# Patient Record
Sex: FEMALE | Race: WHITE | NOT HISPANIC OR LATINO | URBAN - METROPOLITAN AREA
[De-identification: names, ages, dates, MRNs, and addresses within clinical notes are randomized per-mention and may not be internally consistent; named-entity substitution may affect disease eponyms.]

---

## 2017-02-27 ENCOUNTER — INPATIENT (INPATIENT)
Facility: HOSPITAL | Age: 82
LOS: 6 days | Discharge: HOME | End: 2017-03-06
Attending: INTERNAL MEDICINE | Admitting: INTERNAL MEDICINE
Payer: COMMERCIAL

## 2017-02-27 VITALS
SYSTOLIC BLOOD PRESSURE: 125 MMHG | OXYGEN SATURATION: 87 % | DIASTOLIC BLOOD PRESSURE: 67 MMHG | RESPIRATION RATE: 22 BRPM | WEIGHT: 207.01 LBS | TEMPERATURE: 100 F | HEART RATE: 86 BPM | HEIGHT: 66 IN

## 2017-02-27 DIAGNOSIS — E03.9 HYPOTHYROIDISM, UNSPECIFIED: ICD-10-CM

## 2017-02-27 DIAGNOSIS — E66.9 OBESITY, UNSPECIFIED: ICD-10-CM

## 2017-02-27 DIAGNOSIS — B96.0 MYCOPLASMA PNEUMONIAE [M. PNEUMONIAE] AS THE CAUSE OF DISEASES CLASSIFIED ELSEWHERE: ICD-10-CM

## 2017-02-27 DIAGNOSIS — I10 ESSENTIAL (PRIMARY) HYPERTENSION: ICD-10-CM

## 2017-02-27 DIAGNOSIS — Z79.82 LONG TERM (CURRENT) USE OF ASPIRIN: ICD-10-CM

## 2017-02-27 DIAGNOSIS — J18.9 PNEUMONIA, UNSPECIFIED ORGANISM: ICD-10-CM

## 2017-02-27 DIAGNOSIS — F03.90 UNSPECIFIED DEMENTIA, UNSPECIFIED SEVERITY, WITHOUT BEHAVIORAL DISTURBANCE, PSYCHOTIC DISTURBANCE, MOOD DISTURBANCE, AND ANXIETY: ICD-10-CM

## 2017-02-27 DIAGNOSIS — B97.81 HUMAN METAPNEUMOVIRUS AS THE CAUSE OF DISEASES CLASSIFIED ELSEWHERE: ICD-10-CM

## 2017-02-27 DIAGNOSIS — R50.9 FEVER, UNSPECIFIED: ICD-10-CM

## 2017-02-27 DIAGNOSIS — J20.8 ACUTE BRONCHITIS DUE TO OTHER SPECIFIED ORGANISMS: ICD-10-CM

## 2017-02-27 DIAGNOSIS — Z98.890 OTHER SPECIFIED POSTPROCEDURAL STATES: Chronic | ICD-10-CM

## 2017-02-27 LAB
ALBUMIN SERPL ELPH-MCNC: 3.2 G/DL — LOW (ref 3.3–5)
ALP SERPL-CCNC: 42 U/L — SIGNIFICANT CHANGE UP (ref 40–120)
ALT FLD-CCNC: 17 U/L — SIGNIFICANT CHANGE UP (ref 12–78)
ANION GAP SERPL CALC-SCNC: 9 MMOL/L — SIGNIFICANT CHANGE UP (ref 5–17)
APPEARANCE UR: ABNORMAL
AST SERPL-CCNC: 23 U/L — SIGNIFICANT CHANGE UP (ref 15–37)
BACTERIA # UR AUTO: ABNORMAL
BASOPHILS # BLD AUTO: 0.1 K/UL — SIGNIFICANT CHANGE UP (ref 0–0.2)
BASOPHILS NFR BLD AUTO: 1 % — SIGNIFICANT CHANGE UP (ref 0–2)
BILIRUB SERPL-MCNC: 0.5 MG/DL — SIGNIFICANT CHANGE UP (ref 0.2–1.2)
BILIRUB UR-MCNC: NEGATIVE — SIGNIFICANT CHANGE UP
BUN SERPL-MCNC: 11 MG/DL — SIGNIFICANT CHANGE UP (ref 7–23)
CALCIUM SERPL-MCNC: 7.9 MG/DL — LOW (ref 8.5–10.1)
CHLORIDE SERPL-SCNC: 103 MMOL/L — SIGNIFICANT CHANGE UP (ref 96–108)
CO2 SERPL-SCNC: 27 MMOL/L — SIGNIFICANT CHANGE UP (ref 22–31)
COLOR SPEC: YELLOW — SIGNIFICANT CHANGE UP
CREAT SERPL-MCNC: 0.91 MG/DL — SIGNIFICANT CHANGE UP (ref 0.5–1.3)
DIFF PNL FLD: NEGATIVE — SIGNIFICANT CHANGE UP
EOSINOPHIL # BLD AUTO: 0.1 K/UL — SIGNIFICANT CHANGE UP (ref 0–0.5)
EOSINOPHIL NFR BLD AUTO: 2 % — SIGNIFICANT CHANGE UP (ref 0–6)
EPI CELLS # UR: SIGNIFICANT CHANGE UP
FLUAV SPEC QL CULT: NEGATIVE — SIGNIFICANT CHANGE UP
FLUBV AG SPEC QL IA: NEGATIVE — SIGNIFICANT CHANGE UP
GLUCOSE SERPL-MCNC: 138 MG/DL — HIGH (ref 70–99)
GLUCOSE UR QL: NEGATIVE MG/DL — SIGNIFICANT CHANGE UP
HCT VFR BLD CALC: 38 % — SIGNIFICANT CHANGE UP (ref 34.5–45)
HGB BLD-MCNC: 13.2 G/DL — SIGNIFICANT CHANGE UP (ref 11.5–15.5)
KETONES UR-MCNC: NEGATIVE — SIGNIFICANT CHANGE UP
LACTATE SERPL-SCNC: 1 MMOL/L — SIGNIFICANT CHANGE UP (ref 0.7–2)
LEUKOCYTE ESTERASE UR-ACNC: NEGATIVE — SIGNIFICANT CHANGE UP
LYMPHOCYTES # BLD AUTO: 0.9 K/UL — LOW (ref 1–3.3)
LYMPHOCYTES # BLD AUTO: 14.2 % — SIGNIFICANT CHANGE UP (ref 13–44)
MCHC RBC-ENTMCNC: 32.8 PG — SIGNIFICANT CHANGE UP (ref 27–34)
MCHC RBC-ENTMCNC: 34.6 GM/DL — SIGNIFICANT CHANGE UP (ref 32–36)
MCV RBC AUTO: 94.9 FL — SIGNIFICANT CHANGE UP (ref 80–100)
MONOCYTES # BLD AUTO: 0.5 K/UL — SIGNIFICANT CHANGE UP (ref 0–0.9)
MONOCYTES NFR BLD AUTO: 7.1 % — SIGNIFICANT CHANGE UP (ref 2–14)
NEUTROPHILS # BLD AUTO: 4.9 K/UL — SIGNIFICANT CHANGE UP (ref 1.8–7.4)
NEUTROPHILS NFR BLD AUTO: 75.7 % — SIGNIFICANT CHANGE UP (ref 43–77)
NITRITE UR-MCNC: NEGATIVE — SIGNIFICANT CHANGE UP
PH UR: 5 — SIGNIFICANT CHANGE UP (ref 4.8–8)
PLATELET # BLD AUTO: 206 K/UL — SIGNIFICANT CHANGE UP (ref 150–400)
POTASSIUM SERPL-MCNC: 4.2 MMOL/L — SIGNIFICANT CHANGE UP (ref 3.5–5.3)
POTASSIUM SERPL-SCNC: 4.2 MMOL/L — SIGNIFICANT CHANGE UP (ref 3.5–5.3)
PROT SERPL-MCNC: 7 GM/DL — SIGNIFICANT CHANGE UP (ref 6–8.3)
PROT UR-MCNC: 30 MG/DL
RBC # BLD: 4.01 M/UL — SIGNIFICANT CHANGE UP (ref 3.8–5.2)
RBC # FLD: 12.7 % — SIGNIFICANT CHANGE UP (ref 11–15)
SODIUM SERPL-SCNC: 139 MMOL/L — SIGNIFICANT CHANGE UP (ref 135–145)
SP GR SPEC: 1.02 — SIGNIFICANT CHANGE UP (ref 1.01–1.02)
TSH SERPL-MCNC: 4.5 UIU/ML — HIGH (ref 0.36–3.74)
URATE CRY FLD QL MICRO: ABNORMAL
UROBILINOGEN FLD QL: NEGATIVE MG/DL — SIGNIFICANT CHANGE UP
WBC # BLD: 6.4 K/UL — SIGNIFICANT CHANGE UP (ref 3.8–10.5)
WBC # FLD AUTO: 6.4 K/UL — SIGNIFICANT CHANGE UP (ref 3.8–10.5)
WBC UR QL: SIGNIFICANT CHANGE UP

## 2017-02-27 PROCEDURE — 99285 EMERGENCY DEPT VISIT HI MDM: CPT

## 2017-02-27 PROCEDURE — 71010: CPT | Mod: 26

## 2017-02-27 RX ORDER — LEVOTHYROXINE SODIUM 125 MCG
75 TABLET ORAL DAILY
Qty: 0 | Refills: 0 | Status: DISCONTINUED | OUTPATIENT
Start: 2017-02-27 | End: 2017-03-06

## 2017-02-27 RX ORDER — SERTRALINE 25 MG/1
25 TABLET, FILM COATED ORAL DAILY
Qty: 0 | Refills: 0 | Status: DISCONTINUED | OUTPATIENT
Start: 2017-02-27 | End: 2017-03-06

## 2017-02-27 RX ORDER — HEPARIN SODIUM 5000 [USP'U]/ML
5000 INJECTION INTRAVENOUS; SUBCUTANEOUS EVERY 12 HOURS
Qty: 0 | Refills: 0 | Status: DISCONTINUED | OUTPATIENT
Start: 2017-02-27 | End: 2017-03-06

## 2017-02-27 RX ORDER — ACETAMINOPHEN 500 MG
650 TABLET ORAL EVERY 6 HOURS
Qty: 0 | Refills: 0 | Status: DISCONTINUED | OUTPATIENT
Start: 2017-02-27 | End: 2017-03-06

## 2017-02-27 RX ORDER — ACETAMINOPHEN 500 MG
650 TABLET ORAL ONCE
Qty: 0 | Refills: 0 | Status: COMPLETED | OUTPATIENT
Start: 2017-02-27 | End: 2017-02-27

## 2017-02-27 RX ORDER — QUETIAPINE FUMARATE 200 MG/1
25 TABLET, FILM COATED ORAL DAILY
Qty: 0 | Refills: 0 | Status: DISCONTINUED | OUTPATIENT
Start: 2017-02-27 | End: 2017-03-06

## 2017-02-27 RX ORDER — DONEPEZIL HYDROCHLORIDE 10 MG/1
10 TABLET, FILM COATED ORAL AT BEDTIME
Qty: 0 | Refills: 0 | Status: DISCONTINUED | OUTPATIENT
Start: 2017-02-27 | End: 2017-03-06

## 2017-02-27 RX ORDER — PIPERACILLIN AND TAZOBACTAM 4; .5 G/20ML; G/20ML
3.38 INJECTION, POWDER, LYOPHILIZED, FOR SOLUTION INTRAVENOUS ONCE
Qty: 0 | Refills: 0 | Status: COMPLETED | OUTPATIENT
Start: 2017-02-27 | End: 2017-02-27

## 2017-02-27 RX ORDER — MAGNESIUM OXIDE 400 MG ORAL TABLET 241.3 MG
400 TABLET ORAL DAILY
Qty: 0 | Refills: 0 | Status: DISCONTINUED | OUTPATIENT
Start: 2017-02-27 | End: 2017-03-06

## 2017-02-27 RX ORDER — ASPIRIN/CALCIUM CARB/MAGNESIUM 324 MG
81 TABLET ORAL DAILY
Qty: 0 | Refills: 0 | Status: DISCONTINUED | OUTPATIENT
Start: 2017-02-27 | End: 2017-03-06

## 2017-02-27 RX ORDER — PIPERACILLIN AND TAZOBACTAM 4; .5 G/20ML; G/20ML
3.38 INJECTION, POWDER, LYOPHILIZED, FOR SOLUTION INTRAVENOUS EVERY 8 HOURS
Qty: 0 | Refills: 0 | Status: DISCONTINUED | OUTPATIENT
Start: 2017-02-27 | End: 2017-03-05

## 2017-02-27 RX ORDER — SODIUM CHLORIDE 9 MG/ML
500 INJECTION INTRAMUSCULAR; INTRAVENOUS; SUBCUTANEOUS
Qty: 0 | Refills: 0 | Status: COMPLETED | OUTPATIENT
Start: 2017-02-27 | End: 2017-02-27

## 2017-02-27 RX ORDER — SODIUM CHLORIDE 9 MG/ML
3 INJECTION INTRAMUSCULAR; INTRAVENOUS; SUBCUTANEOUS ONCE
Qty: 0 | Refills: 0 | Status: COMPLETED | OUTPATIENT
Start: 2017-02-27 | End: 2017-02-27

## 2017-02-27 RX ORDER — SIMVASTATIN 20 MG/1
20 TABLET, FILM COATED ORAL AT BEDTIME
Qty: 0 | Refills: 0 | Status: DISCONTINUED | OUTPATIENT
Start: 2017-02-27 | End: 2017-03-06

## 2017-02-27 RX ADMIN — SODIUM CHLORIDE 2000 MILLILITER(S): 9 INJECTION INTRAMUSCULAR; INTRAVENOUS; SUBCUTANEOUS at 17:35

## 2017-02-27 RX ADMIN — Medication 650 MILLIGRAM(S): at 17:50

## 2017-02-27 RX ADMIN — DONEPEZIL HYDROCHLORIDE 10 MILLIGRAM(S): 10 TABLET, FILM COATED ORAL at 21:45

## 2017-02-27 RX ADMIN — SODIUM CHLORIDE 2000 MILLILITER(S): 9 INJECTION INTRAMUSCULAR; INTRAVENOUS; SUBCUTANEOUS at 18:00

## 2017-02-27 RX ADMIN — SIMVASTATIN 20 MILLIGRAM(S): 20 TABLET, FILM COATED ORAL at 21:45

## 2017-02-27 RX ADMIN — Medication 75 MICROGRAM(S): at 21:45

## 2017-02-27 RX ADMIN — SODIUM CHLORIDE 3 MILLILITER(S): 9 INJECTION INTRAMUSCULAR; INTRAVENOUS; SUBCUTANEOUS at 17:30

## 2017-02-27 RX ADMIN — SODIUM CHLORIDE 2000 MILLILITER(S): 9 INJECTION INTRAMUSCULAR; INTRAVENOUS; SUBCUTANEOUS at 17:50

## 2017-02-27 RX ADMIN — SODIUM CHLORIDE 2000 MILLILITER(S): 9 INJECTION INTRAMUSCULAR; INTRAVENOUS; SUBCUTANEOUS at 18:15

## 2017-02-27 RX ADMIN — QUETIAPINE FUMARATE 25 MILLIGRAM(S): 200 TABLET, FILM COATED ORAL at 21:49

## 2017-02-27 RX ADMIN — PIPERACILLIN AND TAZOBACTAM 200 GRAM(S): 4; .5 INJECTION, POWDER, LYOPHILIZED, FOR SOLUTION INTRAVENOUS at 17:54

## 2017-02-27 RX ADMIN — SODIUM CHLORIDE 2000 MILLILITER(S): 9 INJECTION INTRAMUSCULAR; INTRAVENOUS; SUBCUTANEOUS at 18:29

## 2017-02-27 NOTE — ED PROVIDER NOTE - OBJECTIVE STATEMENT
Patient sent in from assisted living facility for evaluation of cough and fever. Tmax 101.3F at facility and has been on augmentin for cough for 2 days. Patient is poor historian with hx of dementia but denies any chest pain but does have some dyspnea. She states she has cough for three days.

## 2017-02-27 NOTE — H&P ADULT. - HISTORY OF PRESENT ILLNESS
: Patient sent in from assisted living facility for evaluation of cough and fever. Tmax 101.3F at facility and has been on augmentin for cough for 2 days. Patient is poor historian with hx of dementia but denies any chest pain but does have some dyspnea. She states she has cough for three days.

## 2017-02-27 NOTE — ED PROVIDER NOTE - MEDICAL DECISION MAKING DETAILS
Patient meets crtieria for sepsis will evalute with labs, cxr, and treat with 30cc/kg ivf and zosyn. Patient meets criteria for sepsis will evaluate with labs, cxr, and treat with 30cc/kg ivf and zosyn. Tylenol ordered for fever.

## 2017-02-28 LAB
ANION GAP SERPL CALC-SCNC: 8 MMOL/L — SIGNIFICANT CHANGE UP (ref 5–17)
BUN SERPL-MCNC: 11 MG/DL — SIGNIFICANT CHANGE UP (ref 7–23)
CALCIUM SERPL-MCNC: 7.7 MG/DL — LOW (ref 8.5–10.1)
CHLORIDE SERPL-SCNC: 106 MMOL/L — SIGNIFICANT CHANGE UP (ref 96–108)
CO2 SERPL-SCNC: 26 MMOL/L — SIGNIFICANT CHANGE UP (ref 22–31)
CREAT SERPL-MCNC: 0.88 MG/DL — SIGNIFICANT CHANGE UP (ref 0.5–1.3)
CULTURE RESULTS: NO GROWTH — SIGNIFICANT CHANGE UP
GLUCOSE SERPL-MCNC: 170 MG/DL — HIGH (ref 70–99)
HCT VFR BLD CALC: 36 % — SIGNIFICANT CHANGE UP (ref 34.5–45)
HGB BLD-MCNC: 12.8 G/DL — SIGNIFICANT CHANGE UP (ref 11.5–15.5)
MCHC RBC-ENTMCNC: 33.9 PG — SIGNIFICANT CHANGE UP (ref 27–34)
MCHC RBC-ENTMCNC: 35.7 GM/DL — SIGNIFICANT CHANGE UP (ref 32–36)
MCV RBC AUTO: 94.9 FL — SIGNIFICANT CHANGE UP (ref 80–100)
NT-PROBNP SERPL-SCNC: 2281 PG/ML — HIGH (ref 0–450)
PLATELET # BLD AUTO: 184 K/UL — SIGNIFICANT CHANGE UP (ref 150–400)
POTASSIUM SERPL-MCNC: 3.9 MMOL/L — SIGNIFICANT CHANGE UP (ref 3.5–5.3)
POTASSIUM SERPL-SCNC: 3.9 MMOL/L — SIGNIFICANT CHANGE UP (ref 3.5–5.3)
RBC # BLD: 3.79 M/UL — LOW (ref 3.8–5.2)
RBC # FLD: 13.2 % — SIGNIFICANT CHANGE UP (ref 11–15)
SODIUM SERPL-SCNC: 140 MMOL/L — SIGNIFICANT CHANGE UP (ref 135–145)
SPECIMEN SOURCE: SIGNIFICANT CHANGE UP
T3 SERPL-MCNC: 68 NG/DL — LOW (ref 80–200)
T4 AB SER-ACNC: 7.4 UG/DL — SIGNIFICANT CHANGE UP (ref 4.6–12)
WBC # BLD: 5.8 K/UL — SIGNIFICANT CHANGE UP (ref 3.8–10.5)
WBC # FLD AUTO: 5.8 K/UL — SIGNIFICANT CHANGE UP (ref 3.8–10.5)

## 2017-02-28 RX ORDER — IPRATROPIUM/ALBUTEROL SULFATE 18-103MCG
3 AEROSOL WITH ADAPTER (GRAM) INHALATION ONCE
Qty: 0 | Refills: 0 | Status: COMPLETED | OUTPATIENT
Start: 2017-02-28 | End: 2017-02-28

## 2017-02-28 RX ORDER — FUROSEMIDE 40 MG
20 TABLET ORAL ONCE
Qty: 0 | Refills: 0 | Status: COMPLETED | OUTPATIENT
Start: 2017-02-28 | End: 2017-02-28

## 2017-02-28 RX ADMIN — SIMVASTATIN 20 MILLIGRAM(S): 20 TABLET, FILM COATED ORAL at 21:01

## 2017-02-28 RX ADMIN — SERTRALINE 25 MILLIGRAM(S): 25 TABLET, FILM COATED ORAL at 13:21

## 2017-02-28 RX ADMIN — HEPARIN SODIUM 5000 UNIT(S): 5000 INJECTION INTRAVENOUS; SUBCUTANEOUS at 05:32

## 2017-02-28 RX ADMIN — Medication 75 MICROGRAM(S): at 05:32

## 2017-02-28 RX ADMIN — Medication 81 MILLIGRAM(S): at 13:21

## 2017-02-28 RX ADMIN — MAGNESIUM OXIDE 400 MG ORAL TABLET 400 MILLIGRAM(S): 241.3 TABLET ORAL at 13:21

## 2017-02-28 RX ADMIN — Medication 3 MILLILITER(S): at 05:02

## 2017-02-28 RX ADMIN — QUETIAPINE FUMARATE 25 MILLIGRAM(S): 200 TABLET, FILM COATED ORAL at 13:21

## 2017-02-28 RX ADMIN — Medication 200 MILLIGRAM(S): at 03:46

## 2017-02-28 RX ADMIN — Medication 20 MILLIGRAM(S): at 05:31

## 2017-02-28 RX ADMIN — PIPERACILLIN AND TAZOBACTAM 25 GRAM(S): 4; .5 INJECTION, POWDER, LYOPHILIZED, FOR SOLUTION INTRAVENOUS at 21:01

## 2017-02-28 RX ADMIN — HEPARIN SODIUM 5000 UNIT(S): 5000 INJECTION INTRAVENOUS; SUBCUTANEOUS at 19:19

## 2017-02-28 RX ADMIN — PIPERACILLIN AND TAZOBACTAM 25 GRAM(S): 4; .5 INJECTION, POWDER, LYOPHILIZED, FOR SOLUTION INTRAVENOUS at 11:48

## 2017-02-28 RX ADMIN — DONEPEZIL HYDROCHLORIDE 10 MILLIGRAM(S): 10 TABLET, FILM COATED ORAL at 21:01

## 2017-02-28 RX ADMIN — PIPERACILLIN AND TAZOBACTAM 25 GRAM(S): 4; .5 INJECTION, POWDER, LYOPHILIZED, FOR SOLUTION INTRAVENOUS at 03:47

## 2017-02-28 NOTE — PHYSICAL THERAPY INITIAL EVALUATION ADULT - PLANNED THERAPY INTERVENTIONS, PT EVAL
gait training/balance training/bed mobility training/strengthening/transfer training/postural re-education

## 2017-02-28 NOTE — PHYSICAL THERAPY INITIAL EVALUATION ADULT - IMPAIRMENTS CONTRIBUTING TO GAIT DEVIATIONS, PT EVAL
narrow base of support/impaired balance/impaired coordination/decreased strength/impaired postural control

## 2017-02-28 NOTE — PROGRESS NOTE ADULT - SUBJECTIVE AND OBJECTIVE BOX
Patient is a 89y old  Female who presents with a chief complaint of fever, cough. r/o TAZ TERRAZAS (2017 19:26)   admitted for pneumoniaLLL    INTERVAL HPI/OVERNIGHT EVENTS: uneventful. no fever now. no distress    MEDICATIONS  (STANDING):  heparin  Injectable 5000Unit(s) SubCutaneous every 12 hours  piperacillin/tazobactam IVPB. 3.375Gram(s) IV Intermittent every 8 hours  aspirin  chewable 81milliGRAM(s) Oral daily  sertraline 25milliGRAM(s) Oral daily  simvastatin 20milliGRAM(s) Oral at bedtime  QUEtiapine 25milliGRAM(s) Oral daily  donepezil 10milliGRAM(s) Oral at bedtime  magnesium oxide 400milliGRAM(s) Oral daily  levothyroxine 75MICROGram(s) Oral daily    MEDICATIONS  (PRN):  acetaminophen   Tablet 650milliGRAM(s) Oral every 6 hours PRN For Temp greater than 38 C (100.4 F)      Allergies    No Known Allergies    Intolerances        REVIEW OF SYSTEMS:  CONSTITUTIONAL: No fever, weight loss, or fatigue  EYES: No eye pain, visual disturbances, or discharge  ENMT:  No difficulty hearing, tinnitus, vertigo; No sinus or throat pain  NECK: No pain or stiffness  BREASTS: No pain, masses, or nipple discharge  RESPIRATORY: No cough, wheezing, chills or hemoptysis; No shortness of breath  CARDIOVASCULAR: No chest pain, palpitations, dizziness, or leg swelling  GASTROINTESTINAL: No abdominal or epigastric pain. No nausea, vomiting, or hematemesis; No diarrhea or constipation. No melena or hematochezia.  GENITOURINARY: No dysuria, frequency, hematuria, or incontinence  NEUROLOGICAL: No headaches, memory loss, loss of strength, numbness, or tremors  SKIN: No itching, burning, rashes, or lesions   LYMPH NODES: No enlarged glands  ENDOCRINE: No heat or cold intolerance; No hair loss  MUSCULOSKELETAL: No joint pain or swelling; No muscle, back, or extremity pain  PSYCHIATRIC: No depression, anxiety, mood swings, or difficulty sleeping  HEME/LYMPH: No easy bruising, or bleeding gums  ALLERY AND IMMUNOLOGIC: No hives or eczema    Vital Signs Last 24 Hrs  T(C): 36.9, Max: 37.8 ( @ 17:06)  T(F): 98.4, Max: 100.1 (- @ 17:06)  HR: 65 (65 - 90)  BP: 107/52 (107/52 - 150/84)  BP(mean): --  RR: 20 (20 - 22)  SpO2: 97% (87% - 99%)    PHYSICAL EXAM:  GENERAL: NAD, well-groomed, well-developed  HEAD:  Atraumatic, Normocephalic  EYES: EOMI, PERRLA, conjunctiva and sclera clear  ENMT: No tonsillar erythema, exudates, or enlargement; Moist mucous membranes, Good dentition, No lesions  NECK: Supple, No JVD, Normal thyroid  NERVOUS SYSTEM:  Alert & Oriented X3, Good concentration; Motor Strength 5/5 B/L upper and lower extremities; DTRs 2+ intact and symmetric  CHEST/LUNG: Clear to percussion bilaterally; No rales, rhonchi, wheezing, or rubs  HEART: Regular rate and rhythm; No murmurs, rubs, or gallops  ABDOMEN: Soft, Nontender, Nondistended; Bowel sounds present  EXTREMITIES:  2+ Peripheral Pulses, No clubbing, cyanosis, or edema  LYMPH: No lymphadenopathy noted  SKIN: No rashes or lesions    LABS:                        12.8   5.8   )-----------( 184      ( 2017 08:14 )             36.0     2017 08:14    140    |  106    |  11     ----------------------------<  170    3.9     |  26     |  0.88     Ca    7.7        2017 08:14    TPro  7.0    /  Alb  3.2    /  TBili  0.5    /  DBili  x      /  AST  23     /  ALT  17     /  AlkPhos  42     2017 17:53        Urinalysis Basic - ( 2017 19:08 )    Color: Yellow / Appearance: very cloudy / S.025 / pH: x  Gluc: x / Ketone: Negative  / Bili: Negative / Urobili: Negative mg/dL   Blood: x / Protein: 30 mg/dL / Nitrite: Negative   Leuk Esterase: Negative / RBC: x / WBC 0-2   Sq Epi: x / Non Sq Epi: Few / Bacteria: Moderate      CAPILLARY BLOOD GLUCOSE                RADIOLOGY & ADDITIONAL TESTS:    Imaging Personally Reviewed:  [ ] YES  [ ] NO    Consultant(s) Notes Reviewed:  [ ] YES  [ ] NO    Care Discussed with Consultants/Other Providers [x ] YES  [ ] NO    Care discussed with family,         [  ]   yes  [  ]  No    imp:    stable[ ]    unstable[  ]     improving [ x  ]       unchanged  [  ]                Plans:  Continue present plans  [ x ]               New consult [  ]   specialty  .......               order test[  ]    test name.                  Discharge Planning  [  ]

## 2017-02-28 NOTE — PHYSICAL THERAPY INITIAL EVALUATION ADULT - MODIFIED CLINICAL TEST OF SENSORY INTEGRATION IN BALANCE TEST
Barthel Index: Feeding Score 10___, Bathing Score _0__, Grooming Score _0__, Dressing Score __0_, Bowels Score _0__, Bladder Score __0_, Toilet Score _0__, Transfers Score _0__, Mobility Score _0__, Stairs Score __0_,     Total Score __10_

## 2017-03-01 LAB
CRP SERPL-MCNC: 3.31 MG/DL — HIGH (ref 0–0.4)
ERYTHROCYTE [SEDIMENTATION RATE] IN BLOOD: 45 MM/HR — HIGH (ref 0–20)

## 2017-03-01 RX ORDER — DIPHENHYDRAMINE HCL 50 MG
25 CAPSULE ORAL ONCE
Qty: 0 | Refills: 0 | Status: COMPLETED | OUTPATIENT
Start: 2017-03-01 | End: 2017-03-02

## 2017-03-01 RX ORDER — DIPHENHYDRAMINE HCL 50 MG
25 CAPSULE ORAL ONCE
Qty: 0 | Refills: 0 | Status: COMPLETED | OUTPATIENT
Start: 2017-03-01 | End: 2017-03-01

## 2017-03-01 RX ORDER — IPRATROPIUM/ALBUTEROL SULFATE 18-103MCG
3 AEROSOL WITH ADAPTER (GRAM) INHALATION ONCE
Qty: 0 | Refills: 0 | Status: COMPLETED | OUTPATIENT
Start: 2017-03-01 | End: 2017-03-01

## 2017-03-01 RX ORDER — IPRATROPIUM/ALBUTEROL SULFATE 18-103MCG
3 AEROSOL WITH ADAPTER (GRAM) INHALATION EVERY 6 HOURS
Qty: 0 | Refills: 0 | Status: DISCONTINUED | OUTPATIENT
Start: 2017-03-01 | End: 2017-03-06

## 2017-03-01 RX ADMIN — PIPERACILLIN AND TAZOBACTAM 25 GRAM(S): 4; .5 INJECTION, POWDER, LYOPHILIZED, FOR SOLUTION INTRAVENOUS at 22:02

## 2017-03-01 RX ADMIN — PIPERACILLIN AND TAZOBACTAM 25 GRAM(S): 4; .5 INJECTION, POWDER, LYOPHILIZED, FOR SOLUTION INTRAVENOUS at 14:05

## 2017-03-01 RX ADMIN — MAGNESIUM OXIDE 400 MG ORAL TABLET 400 MILLIGRAM(S): 241.3 TABLET ORAL at 11:15

## 2017-03-01 RX ADMIN — SERTRALINE 25 MILLIGRAM(S): 25 TABLET, FILM COATED ORAL at 11:15

## 2017-03-01 RX ADMIN — Medication 3 MILLILITER(S): at 07:50

## 2017-03-01 RX ADMIN — Medication 3 MILLILITER(S): at 11:26

## 2017-03-01 RX ADMIN — Medication 100 MILLIGRAM(S): at 07:55

## 2017-03-01 RX ADMIN — Medication 3 MILLILITER(S): at 17:11

## 2017-03-01 RX ADMIN — HEPARIN SODIUM 5000 UNIT(S): 5000 INJECTION INTRAVENOUS; SUBCUTANEOUS at 06:00

## 2017-03-01 RX ADMIN — QUETIAPINE FUMARATE 25 MILLIGRAM(S): 200 TABLET, FILM COATED ORAL at 11:16

## 2017-03-01 RX ADMIN — DONEPEZIL HYDROCHLORIDE 10 MILLIGRAM(S): 10 TABLET, FILM COATED ORAL at 22:02

## 2017-03-01 RX ADMIN — Medication 650 MILLIGRAM(S): at 05:58

## 2017-03-01 RX ADMIN — Medication 100 MILLIGRAM(S): at 22:07

## 2017-03-01 RX ADMIN — SIMVASTATIN 20 MILLIGRAM(S): 20 TABLET, FILM COATED ORAL at 22:03

## 2017-03-01 RX ADMIN — Medication 25 MILLIGRAM(S): at 02:11

## 2017-03-01 RX ADMIN — Medication 81 MILLIGRAM(S): at 11:15

## 2017-03-01 RX ADMIN — PIPERACILLIN AND TAZOBACTAM 25 GRAM(S): 4; .5 INJECTION, POWDER, LYOPHILIZED, FOR SOLUTION INTRAVENOUS at 05:59

## 2017-03-01 RX ADMIN — HEPARIN SODIUM 5000 UNIT(S): 5000 INJECTION INTRAVENOUS; SUBCUTANEOUS at 17:46

## 2017-03-01 RX ADMIN — Medication 3 MILLILITER(S): at 23:48

## 2017-03-01 RX ADMIN — Medication 75 MICROGRAM(S): at 05:58

## 2017-03-01 NOTE — PROGRESS NOTE ADULT - SUBJECTIVE AND OBJECTIVE BOX
Patient is a 89y old  Female who presents with a chief complaint of fever, cough. r/o PNA, LLL (2017 19:26)  rapid RVP is positive. final report pending.    INTERVAL HPI/OVERNIGHT EVENTS:T max 101.2, some wheezing    MEDICATIONS  (STANDING):  heparin  Injectable 5000Unit(s) SubCutaneous every 12 hours  piperacillin/tazobactam IVPB. 3.375Gram(s) IV Intermittent every 8 hours  aspirin  chewable 81milliGRAM(s) Oral daily  sertraline 25milliGRAM(s) Oral daily  simvastatin 20milliGRAM(s) Oral at bedtime  QUEtiapine 25milliGRAM(s) Oral daily  donepezil 10milliGRAM(s) Oral at bedtime  magnesium oxide 400milliGRAM(s) Oral daily  levothyroxine 75MICROGram(s) Oral daily  ALBUTerol/ipratropium for Nebulization 3milliLiter(s) Nebulizer every 6 hours  levoFLOXacin IVPB  IV Intermittent     MEDICATIONS  (PRN):  acetaminophen   Tablet 650milliGRAM(s) Oral every 6 hours PRN For Temp greater than 38 C (100.4 F)  guaiFENesin    Syrup 100milliGRAM(s) Oral every 6 hours PRN Cough      Allergies    No Known Allergies    Intolerances        REVIEW OF SYSTEMS:  CONSTITUTIONAL: No fever, weight loss, or fatigue  EYES: No eye pain, visual disturbances, or discharge  ENMT:  No difficulty hearing, tinnitus, vertigo; No sinus or throat pain  NECK: No pain or stiffness  BREASTS: No pain, masses, or nipple discharge  RESPIRATORY: No cough, wheezing, chills or hemoptysis; No shortness of breath  CARDIOVASCULAR: No chest pain, palpitations, dizziness, or leg swelling  GASTROINTESTINAL: No abdominal or epigastric pain. No nausea, vomiting, or hematemesis; No diarrhea or constipation. No melena or hematochezia.  GENITOURINARY: No dysuria, frequency, hematuria, or incontinence  NEUROLOGICAL: No headaches, memory loss, loss of strength, numbness, or tremors  SKIN: No itching, burning, rashes, or lesions   LYMPH NODES: No enlarged glands  ENDOCRINE: No heat or cold intolerance; No hair loss  MUSCULOSKELETAL: No joint pain or swelling; No muscle, back, or extremity pain  PSYCHIATRIC: No depression, anxiety, mood swings, or difficulty sleeping  HEME/LYMPH: No easy bruising, or bleeding gums  ALLERY AND IMMUNOLOGIC: No hives or eczema    Vital Signs Last 24 Hrs  T(C): 38.8, Max: 38.8 ( @ 05:49)  T(F): 101.8, Max: 101.8 ( @ 05:49)  HR: 75 (65 - 79)  BP: 112/61 (107/52 - 126/57)  BP(mean): --  RR: 18 (18 - 20)  SpO2: 93% (93% - 97%)    PHYSICAL EXAM:  GENERAL: NAD, well-groomed, well-developed  HEAD:  Atraumatic, Normocephalic  EYES: EOMI, PERRLA, conjunctiva and sclera clear  ENMT: No tonsillar erythema, exudates, or enlargement; Moist mucous membranes, Good dentition, No lesions  NECK: Supple, No JVD, Normal thyroid  NERVOUS SYSTEM:  Alert & Oriented X3, Good concentration; Motor Strength 5/5 B/L upper and lower extremities; DTRs 2+ intact and symmetric  CHEST/LUNG: Clear to percussion bilaterally; No rales, rhonchi,  or rubs, mild wheezing at bases  HEART: Regular rate and rhythm; No murmurs, rubs, or gallops  ABDOMEN: Soft, Nontender, Nondistended; Bowel sounds present  EXTREMITIES:  2+ Peripheral Pulses, No clubbing, cyanosis, or edema  LYMPH: No lymphadenopathy noted  SKIN: No rashes or lesions    LABS:                        12.8   5.8   )-----------( 184      ( 2017 08:14 )             36.0     2017 08:14    140    |  106    |  11     ----------------------------<  170    3.9     |  26     |  0.88     Ca    7.7        2017 08:14    TPro  7.0    /  Alb  3.2    /  TBili  0.5    /  DBili  x      /  AST  23     /  ALT  17     /  AlkPhos  42     2017 17:53        Urinalysis Basic - ( 2017 19:08 )    Color: Yellow / Appearance: very cloudy / S.025 / pH: x  Gluc: x / Ketone: Negative  / Bili: Negative / Urobili: Negative mg/dL   Blood: x / Protein: 30 mg/dL / Nitrite: Negative   Leuk Esterase: Negative / RBC: x / WBC 0-2   Sq Epi: x / Non Sq Epi: Few / Bacteria: Moderate      CAPILLARY BLOOD GLUCOSE                RADIOLOGY & ADDITIONAL TESTS:    Imaging Personally Reviewed:  [ ] YES  [ ] NO    Consultant(s) Notes Reviewed:  [ ] YES  [ ] NO    Care Discussed with Consultants/Other Providers [ ] YES  [ ] NO    Care discussed with family,         [  ]   yes  [  ]  No    imp:    stable[ ]    unstable[  ]     improving [   ]       unchanged  [  x]                Plans:  Continue present plans  [ x ]               New consult [  ]   specialty  .......               order test[  ]    test name.                  Discharge Planning  [  ]

## 2017-03-02 LAB
HCT VFR BLD CALC: 34.6 % — SIGNIFICANT CHANGE UP (ref 34.5–45)
HGB BLD-MCNC: 12.5 G/DL — SIGNIFICANT CHANGE UP (ref 11.5–15.5)
MCHC RBC-ENTMCNC: 33.4 PG — SIGNIFICANT CHANGE UP (ref 27–34)
MCHC RBC-ENTMCNC: 36 GM/DL — SIGNIFICANT CHANGE UP (ref 32–36)
MCV RBC AUTO: 92.8 FL — SIGNIFICANT CHANGE UP (ref 80–100)
PLATELET # BLD AUTO: 172 K/UL — SIGNIFICANT CHANGE UP (ref 150–400)
RBC # BLD: 3.73 M/UL — LOW (ref 3.8–5.2)
RBC # FLD: 12.6 % — SIGNIFICANT CHANGE UP (ref 11–15)
WBC # BLD: 5.7 K/UL — SIGNIFICANT CHANGE UP (ref 3.8–10.5)
WBC # FLD AUTO: 5.7 K/UL — SIGNIFICANT CHANGE UP (ref 3.8–10.5)

## 2017-03-02 PROCEDURE — 71010: CPT | Mod: 26

## 2017-03-02 RX ORDER — DIPHENHYDRAMINE HCL 50 MG
25 CAPSULE ORAL ONCE
Qty: 0 | Refills: 0 | Status: COMPLETED | OUTPATIENT
Start: 2017-03-02 | End: 2017-03-02

## 2017-03-02 RX ADMIN — Medication 3 MILLILITER(S): at 11:25

## 2017-03-02 RX ADMIN — SIMVASTATIN 20 MILLIGRAM(S): 20 TABLET, FILM COATED ORAL at 21:04

## 2017-03-02 RX ADMIN — Medication 100 MILLIGRAM(S): at 04:54

## 2017-03-02 RX ADMIN — Medication 25 MILLIGRAM(S): at 22:52

## 2017-03-02 RX ADMIN — MAGNESIUM OXIDE 400 MG ORAL TABLET 400 MILLIGRAM(S): 241.3 TABLET ORAL at 12:48

## 2017-03-02 RX ADMIN — QUETIAPINE FUMARATE 25 MILLIGRAM(S): 200 TABLET, FILM COATED ORAL at 12:48

## 2017-03-02 RX ADMIN — Medication 3 MILLILITER(S): at 05:45

## 2017-03-02 RX ADMIN — PIPERACILLIN AND TAZOBACTAM 25 GRAM(S): 4; .5 INJECTION, POWDER, LYOPHILIZED, FOR SOLUTION INTRAVENOUS at 07:24

## 2017-03-02 RX ADMIN — SERTRALINE 25 MILLIGRAM(S): 25 TABLET, FILM COATED ORAL at 12:48

## 2017-03-02 RX ADMIN — Medication 81 MILLIGRAM(S): at 12:47

## 2017-03-02 RX ADMIN — Medication 3 MILLILITER(S): at 17:30

## 2017-03-02 RX ADMIN — HEPARIN SODIUM 5000 UNIT(S): 5000 INJECTION INTRAVENOUS; SUBCUTANEOUS at 17:05

## 2017-03-02 RX ADMIN — HEPARIN SODIUM 5000 UNIT(S): 5000 INJECTION INTRAVENOUS; SUBCUTANEOUS at 07:24

## 2017-03-02 RX ADMIN — DONEPEZIL HYDROCHLORIDE 10 MILLIGRAM(S): 10 TABLET, FILM COATED ORAL at 21:04

## 2017-03-02 RX ADMIN — Medication 25 MILLIGRAM(S): at 00:12

## 2017-03-02 RX ADMIN — Medication 75 MICROGRAM(S): at 07:24

## 2017-03-02 RX ADMIN — Medication 3 MILLILITER(S): at 23:53

## 2017-03-02 RX ADMIN — PIPERACILLIN AND TAZOBACTAM 25 GRAM(S): 4; .5 INJECTION, POWDER, LYOPHILIZED, FOR SOLUTION INTRAVENOUS at 23:06

## 2017-03-02 RX ADMIN — PIPERACILLIN AND TAZOBACTAM 25 GRAM(S): 4; .5 INJECTION, POWDER, LYOPHILIZED, FOR SOLUTION INTRAVENOUS at 16:09

## 2017-03-02 NOTE — CONSULT NOTE ADULT - SUBJECTIVE AND OBJECTIVE BOX
Patient is a 89y old  Female who presents with a chief complaint of shortness of breath (28 Feb 2017 02:43)      HPI:  Patient sent in from assisted living facility for evaluation of cough and fever. Tmax 101.3F at facility and has been on augmentin for cough for 2 days. Patient is poor historian with hx of dementia but denies any chest pain. does have some dyspnea with wheezing. She states she has cough for three days.    PAST MEDICAL & SURGICAL HISTORY:  Dementia  Hypothyroid  HTN (hypertension)  H/O knee surgery  No significant past surgical history    FAMILY HISTORY:  No pertinent family history in first degree relatives    SOCIAL HISTORY: non smoker    Allergies  No Known Allergies    MEDICATIONS  (STANDING):  heparin  Injectable 5000Unit(s) SubCutaneous every 12 hours  piperacillin/tazobactam IVPB. 3.375Gram(s) IV Intermittent every 8 hours  aspirin  chewable 81milliGRAM(s) Oral daily  sertraline 25milliGRAM(s) Oral daily  simvastatin 20milliGRAM(s) Oral at bedtime  QUEtiapine 25milliGRAM(s) Oral daily  donepezil 10milliGRAM(s) Oral at bedtime  magnesium oxide 400milliGRAM(s) Oral daily  levothyroxine 75MICROGram(s) Oral daily  levoFLOXacin IVPB 250milliGRAM(s) IV Intermittent every 24 hours  ALBUTerol/ipratropium for Nebulization 3milliLiter(s) Nebulizer every 6 hours  levoFLOXacin IVPB  IV Intermittent     MEDICATIONS  (PRN):  acetaminophen   Tablet 650milliGRAM(s) Oral every 6 hours PRN For Temp greater than 38 C (100.4 F)  guaiFENesin    Syrup 100milliGRAM(s) Oral every 6 hours PRN Cough    REVIEW OF SYSTEMS:    Constitutional:            +fever,   HEENT:                      No difficulty hearing, tinnitus, vertigo; No sinus or throat pain  Respiratory:               sob with wheezing, cough.  Cardiovascular:           No chest pain, palpitations  Gastrointestinal:        No abdominal or epigastric pain. No N/V/diarrhea or hematemesis  Genitourinary:            No dysuria, frequency, hematuria or incontinence  SKIN:                             no rash  Musculoskeletal:        No joint pain or swelling  Extremities:                No swelling  Neurological:              No headaches  Psychiatric:                 No depression, anxiety    Vital Signs Last 24 Hrs  T(C): 37.1, Max: 37.1 (03-01 @ 17:35)  T(F): 98.8, Max: 98.8 (03-01 @ 17:35)  HR: 71 (68 - 84)  BP: 115/62 (110/65 - 120/89)  BP(mean): --  RR: 18 (18 - 18)  SpO2: 96% (95% - 98%)    PHYSICAL EXAM:  GEN:         Awake, responsive and comfortable.  HEENT:    Normal.    RESP:      decreased air entry.  CVS:         Regular rate and rhythm.   ABD:         Soft, non-tender, non-distended;   :             No costovertebral angle tenderness  SKIN:           Warm and dry.  EXTR:            No clubbing, cyanosis or edema  CNS:              Intact sensory and motor function.  PSYCH:        cooperative, no anxiety or depression    LABS:                        12.5   5.7   )-----------( 172      ( 02 Mar 2017 06:11 )             34.6   EKG: regular rhythm    RADIOLOGY & ADDITIONAL STUDIES:    EXAM:  CHEST SINGLE VIEW                            PROCEDURE DATE:  02/27/2017        INTERPRETATION:  Single AP view of the chest. Comparison is made to   3/2/2014.    CLINICAL INFORMATION: Sepsis, cough    FINDINGS:  There are chronic lung changes and questionable small left   basilar atelectasis and/or pneumonia. There is mild elevation of the   right hemidiaphragm. Heart size is within normal limits.    IMPRESSION: Questionable small left basilar atelectasis and/or pneumonia.    DAPHNE DYKES M.D., ATTENDING RADIOLOGIST  This document has been electronically signed. Feb 27 2017  7:10PM      ASSESSMENT AND PLAN:  ·	SOB with wheezing.  ·	hMPV tracheobronchitis.  ·	Suspected left base pneumonia.  ·	HTN.  ·	Hypothyroidism.  ·	Dementia.    Continue antibiotics, nebulizer.  Will obtain procalcitonin level.  Follow up CXR.

## 2017-03-03 LAB
ANION GAP SERPL CALC-SCNC: 6 MMOL/L — SIGNIFICANT CHANGE UP (ref 5–17)
BUN SERPL-MCNC: 9 MG/DL — SIGNIFICANT CHANGE UP (ref 7–23)
CALCIUM SERPL-MCNC: 8 MG/DL — LOW (ref 8.5–10.1)
CHLORIDE SERPL-SCNC: 103 MMOL/L — SIGNIFICANT CHANGE UP (ref 96–108)
CO2 SERPL-SCNC: 31 MMOL/L — SIGNIFICANT CHANGE UP (ref 22–31)
CREAT SERPL-MCNC: 0.76 MG/DL — SIGNIFICANT CHANGE UP (ref 0.5–1.3)
GLUCOSE SERPL-MCNC: 129 MG/DL — HIGH (ref 70–99)
HCT VFR BLD CALC: 35.5 % — SIGNIFICANT CHANGE UP (ref 34.5–45)
HGB BLD-MCNC: 12.2 G/DL — SIGNIFICANT CHANGE UP (ref 11.5–15.5)
M PNEUMO IGG SER IA-ACNC: 1.64 INDEX — HIGH
M PNEUMO IGG SER IA-ACNC: POSITIVE
M PNEUMO IGM SER-ACNC: 102 UNITS/ML — SIGNIFICANT CHANGE UP
MAGNESIUM SERPL-MCNC: 2.6 MG/DL — HIGH (ref 1.8–2.4)
MCHC RBC-ENTMCNC: 32.3 PG — SIGNIFICANT CHANGE UP (ref 27–34)
MCHC RBC-ENTMCNC: 34.5 GM/DL — SIGNIFICANT CHANGE UP (ref 32–36)
MCV RBC AUTO: 93.6 FL — SIGNIFICANT CHANGE UP (ref 80–100)
MYCOPLASMA AG SPEC QL: NEGATIVE — SIGNIFICANT CHANGE UP
PLATELET # BLD AUTO: 194 K/UL — SIGNIFICANT CHANGE UP (ref 150–400)
POTASSIUM SERPL-MCNC: 3.6 MMOL/L — SIGNIFICANT CHANGE UP (ref 3.5–5.3)
POTASSIUM SERPL-SCNC: 3.6 MMOL/L — SIGNIFICANT CHANGE UP (ref 3.5–5.3)
PROCALCITONIN SERPL-MCNC: <0.05 NG/ML — SIGNIFICANT CHANGE UP (ref 0–0.05)
RBC # BLD: 3.79 M/UL — LOW (ref 3.8–5.2)
RBC # FLD: 12.5 % — SIGNIFICANT CHANGE UP (ref 11–15)
SODIUM SERPL-SCNC: 140 MMOL/L — SIGNIFICANT CHANGE UP (ref 135–145)
WBC # BLD: 5.4 K/UL — SIGNIFICANT CHANGE UP (ref 3.8–10.5)
WBC # FLD AUTO: 5.4 K/UL — SIGNIFICANT CHANGE UP (ref 3.8–10.5)

## 2017-03-03 RX ADMIN — PIPERACILLIN AND TAZOBACTAM 25 GRAM(S): 4; .5 INJECTION, POWDER, LYOPHILIZED, FOR SOLUTION INTRAVENOUS at 21:51

## 2017-03-03 RX ADMIN — Medication 3 MILLILITER(S): at 05:43

## 2017-03-03 RX ADMIN — SIMVASTATIN 20 MILLIGRAM(S): 20 TABLET, FILM COATED ORAL at 21:51

## 2017-03-03 RX ADMIN — HEPARIN SODIUM 5000 UNIT(S): 5000 INJECTION INTRAVENOUS; SUBCUTANEOUS at 05:14

## 2017-03-03 RX ADMIN — SERTRALINE 25 MILLIGRAM(S): 25 TABLET, FILM COATED ORAL at 14:48

## 2017-03-03 RX ADMIN — Medication 75 MICROGRAM(S): at 05:11

## 2017-03-03 RX ADMIN — DONEPEZIL HYDROCHLORIDE 10 MILLIGRAM(S): 10 TABLET, FILM COATED ORAL at 21:51

## 2017-03-03 RX ADMIN — Medication 3 MILLILITER(S): at 17:06

## 2017-03-03 RX ADMIN — HEPARIN SODIUM 5000 UNIT(S): 5000 INJECTION INTRAVENOUS; SUBCUTANEOUS at 17:43

## 2017-03-03 RX ADMIN — Medication 81 MILLIGRAM(S): at 14:47

## 2017-03-03 RX ADMIN — Medication 3 MILLILITER(S): at 23:44

## 2017-03-03 RX ADMIN — QUETIAPINE FUMARATE 25 MILLIGRAM(S): 200 TABLET, FILM COATED ORAL at 14:47

## 2017-03-03 RX ADMIN — Medication 3 MILLILITER(S): at 11:07

## 2017-03-03 RX ADMIN — MAGNESIUM OXIDE 400 MG ORAL TABLET 400 MILLIGRAM(S): 241.3 TABLET ORAL at 17:43

## 2017-03-03 RX ADMIN — PIPERACILLIN AND TAZOBACTAM 25 GRAM(S): 4; .5 INJECTION, POWDER, LYOPHILIZED, FOR SOLUTION INTRAVENOUS at 14:48

## 2017-03-03 RX ADMIN — PIPERACILLIN AND TAZOBACTAM 25 GRAM(S): 4; .5 INJECTION, POWDER, LYOPHILIZED, FOR SOLUTION INTRAVENOUS at 05:11

## 2017-03-03 NOTE — PROGRESS NOTE ADULT - SUBJECTIVE AND OBJECTIVE BOX
INTERVAL HPI/OVERNIGHT EVENTS:  Patient sent in from assisted living facility for evaluation of cough and fever. Tmax 101.3F at facility and has been on augmentin for cough for 2 days. Patient is poor historian with hx of dementia but denies any chest pain. does have some dyspnea with wheezing. She states she has cough for three days.  Awake, responsive, on nebulizer treatments.    Vital Signs Last 24 Hrs  T(C): 36.6, Max: 37.1 (03-02 @ 19:21)  T(F): 97.9, Max: 98.8 (03-02 @ 19:21)  HR: 71 (63 - 80)  BP: 124/68 (103/54 - 147/84)  BP(mean): --  RR: 16 (16 - 18)  SpO2: 98% (95% - 98%)    PHYSICAL EXAM:  GEN:        Awake, responsive and comfortable.  HEENT:    Normal.    RESP:      decreased air entry. occasional wheezing.  CVS:          Regular rate and rhythm.   ABD:         Soft, non-tender, non-distended;     MEDICATIONS  (STANDING):  heparin  Injectable 5000Unit(s) SubCutaneous every 12 hours  piperacillin/tazobactam IVPB. 3.375Gram(s) IV Intermittent every 8 hours  aspirin  chewable 81milliGRAM(s) Oral daily  sertraline 25milliGRAM(s) Oral daily  simvastatin 20milliGRAM(s) Oral at bedtime  QUEtiapine 25milliGRAM(s) Oral daily  donepezil 10milliGRAM(s) Oral at bedtime  magnesium oxide 400milliGRAM(s) Oral daily  levothyroxine 75MICROGram(s) Oral daily  levoFLOXacin IVPB 250milliGRAM(s) IV Intermittent every 24 hours  ALBUTerol/ipratropium for Nebulization 3milliLiter(s) Nebulizer every 6 hours  levoFLOXacin IVPB  IV Intermittent     MEDICATIONS  (PRN):  acetaminophen   Tablet 650milliGRAM(s) Oral every 6 hours PRN For Temp greater than 38 C (100.4 F)  guaiFENesin    Syrup 100milliGRAM(s) Oral every 6 hours PRN Cough  LORazepam   Injectable 0.5milliGRAM(s) IntraMuscular once PRN Agitation    LABS:                        12.2   5.4   )-----------( 194      ( 03 Mar 2017 08:54 )             35.5     03 Mar 2017 09:01    140    |  103    |  9      ----------------------------<  129    3.6     |  31     |  0.76     Ca    8.0        03 Mar 2017 09:01  Mg     2.6       03 Mar 2017 09:01    ASSESSMENT AND PLAN:  ·	SOB with wheezing.  ·	hMPV tracheobronchitis.  ·	Doubt bacterial pneumonia.  ·	HTN.  ·	Hypothyroidism.  ·	Dementia.    Continue Nebulizer with Chest PT as needed.  OOB to chair.

## 2017-03-03 NOTE — PROGRESS NOTE ADULT - SUBJECTIVE AND OBJECTIVE BOX
Patient is a 89y old  Female who presents with a chief complaint of shortness of breath (28 Feb 2017 02:43)  virus panel +. .pulmonary notes appreciated. there is improvement in general condition.     INTERVAL HPI/OVERNIGHT EVENTS:uneventful.     MEDICATIONS  (STANDING):  heparin  Injectable 5000Unit(s) SubCutaneous every 12 hours  piperacillin/tazobactam IVPB. 3.375Gram(s) IV Intermittent every 8 hours  aspirin  chewable 81milliGRAM(s) Oral daily  sertraline 25milliGRAM(s) Oral daily  simvastatin 20milliGRAM(s) Oral at bedtime  QUEtiapine 25milliGRAM(s) Oral daily  donepezil 10milliGRAM(s) Oral at bedtime  magnesium oxide 400milliGRAM(s) Oral daily  levothyroxine 75MICROGram(s) Oral daily  levoFLOXacin IVPB 250milliGRAM(s) IV Intermittent every 24 hours  ALBUTerol/ipratropium for Nebulization 3milliLiter(s) Nebulizer every 6 hours  levoFLOXacin IVPB  IV Intermittent     MEDICATIONS  (PRN):  acetaminophen   Tablet 650milliGRAM(s) Oral every 6 hours PRN For Temp greater than 38 C (100.4 F)  guaiFENesin    Syrup 100milliGRAM(s) Oral every 6 hours PRN Cough  LORazepam   Injectable 0.5milliGRAM(s) IntraMuscular once PRN Agitation      Allergies    No Known Allergies    Intolerances        REVIEW OF SYSTEMS:  CONSTITUTIONAL: No fever, weight loss, or fatigue  EYES: No eye pain, visual disturbances, or discharge  ENMT:  No difficulty hearing, tinnitus, vertigo; No sinus or throat pain  NECK: No pain or stiffness  BREASTS: No pain, masses, or nipple discharge  RESPIRATORY: No cough, wheezing, chills or hemoptysis; No shortness of breath  CARDIOVASCULAR: No chest pain, palpitations, dizziness, or leg swelling  GASTROINTESTINAL: No abdominal or epigastric pain. No nausea, vomiting, or hematemesis; No diarrhea or constipation. No melena or hematochezia.  GENITOURINARY: No dysuria, frequency, hematuria, or incontinence  NEUROLOGICAL: No headaches, memory loss, loss of strength, numbness, or tremors  SKIN: No itching, burning, rashes, or lesions   LYMPH NODES: No enlarged glands  ENDOCRINE: No heat or cold intolerance; No hair loss  MUSCULOSKELETAL: No joint pain or swelling; No muscle, back, or extremity pain  PSYCHIATRIC: No depression, anxiety, mood swings, or difficulty sleeping  HEME/LYMPH: No easy bruising, or bleeding gums  ALLERY AND IMMUNOLOGIC: No hives or eczema    Vital Signs Last 24 Hrs  T(C): 36.1, Max: 37.1 (03-02 @ 19:21)  T(F): 97, Max: 98.8 (03-02 @ 19:21)  HR: 73 (65 - 80)  BP: 147/84 (118/67 - 147/84)  BP(mean): --  RR: 16 (16 - 18)  SpO2: 98% (95% - 98%)    PHYSICAL EXAM:  GENERAL: NAD, well-groomed, well-developed  HEAD:  Atraumatic, Normocephalic  EYES: EOMI, PERRLA, conjunctiva and sclera clear  ENMT: No tonsillar erythema, exudates, or enlargement; Moist mucous membranes, Good dentition, No lesions  NECK: Supple, No JVD, Normal thyroid  NERVOUS SYSTEM:  Alert & Oriented X3, Good concentration; Motor Strength 5/5 B/L upper and lower extremities; DTRs 2+ intact and symmetric  CHEST/LUNG: Clear to percussion bilaterally; No rales, rhonchi, wheezing, or rubs  HEART: Regular rate and rhythm; No murmurs, rubs, or gallops  ABDOMEN: Soft, Nontender, Nondistended; Bowel sounds present  EXTREMITIES:  2+ Peripheral Pulses, No clubbing, cyanosis, or edema  LYMPH: No lymphadenopathy noted  SKIN: No rashes or lesions    LABS:                        12.2   5.4   )-----------( 194      ( 03 Mar 2017 08:54 )             35.5     03 Mar 2017 09:01    140    |  103    |  9      ----------------------------<  129    3.6     |  31     |  0.76     Ca    8.0        03 Mar 2017 09:01  Mg     2.6       03 Mar 2017 09:01            CAPILLARY BLOOD GLUCOSE                RADIOLOGY & ADDITIONAL TESTS:    Imaging Personally Reviewed:  [ ] YES  [ ] NO    Consultant(s) Notes Reviewed:  [ ] YES  [ ] NO    Care Discussed with Consultants/Other Providers [ ] YES  [ ] NO    Care discussed with family,         [  ]   yes  [  ]  No    imp:    stable[ ]    unstable[  ]     improving [   ]       unchanged  [  ]                Plans:  Continue present plans  [ x ]               New consult [  ]   specialty  .......               order test[  ]    test name.                  Discharge Planning  [  ]

## 2017-03-04 RX ADMIN — Medication 3 MILLILITER(S): at 12:58

## 2017-03-04 RX ADMIN — SERTRALINE 25 MILLIGRAM(S): 25 TABLET, FILM COATED ORAL at 12:10

## 2017-03-04 RX ADMIN — PIPERACILLIN AND TAZOBACTAM 25 GRAM(S): 4; .5 INJECTION, POWDER, LYOPHILIZED, FOR SOLUTION INTRAVENOUS at 13:23

## 2017-03-04 RX ADMIN — MAGNESIUM OXIDE 400 MG ORAL TABLET 400 MILLIGRAM(S): 241.3 TABLET ORAL at 12:11

## 2017-03-04 RX ADMIN — Medication 75 MICROGRAM(S): at 05:46

## 2017-03-04 RX ADMIN — Medication 0.5 MILLIGRAM(S): at 10:52

## 2017-03-04 RX ADMIN — QUETIAPINE FUMARATE 25 MILLIGRAM(S): 200 TABLET, FILM COATED ORAL at 12:10

## 2017-03-04 RX ADMIN — PIPERACILLIN AND TAZOBACTAM 25 GRAM(S): 4; .5 INJECTION, POWDER, LYOPHILIZED, FOR SOLUTION INTRAVENOUS at 21:53

## 2017-03-04 RX ADMIN — HEPARIN SODIUM 5000 UNIT(S): 5000 INJECTION INTRAVENOUS; SUBCUTANEOUS at 05:46

## 2017-03-04 RX ADMIN — Medication 3 MILLILITER(S): at 17:12

## 2017-03-04 RX ADMIN — HEPARIN SODIUM 5000 UNIT(S): 5000 INJECTION INTRAVENOUS; SUBCUTANEOUS at 17:02

## 2017-03-04 RX ADMIN — Medication 20 MILLIGRAM(S): at 21:53

## 2017-03-04 RX ADMIN — DONEPEZIL HYDROCHLORIDE 10 MILLIGRAM(S): 10 TABLET, FILM COATED ORAL at 21:53

## 2017-03-04 RX ADMIN — Medication 3 MILLILITER(S): at 05:59

## 2017-03-04 RX ADMIN — PIPERACILLIN AND TAZOBACTAM 25 GRAM(S): 4; .5 INJECTION, POWDER, LYOPHILIZED, FOR SOLUTION INTRAVENOUS at 05:48

## 2017-03-04 RX ADMIN — SIMVASTATIN 20 MILLIGRAM(S): 20 TABLET, FILM COATED ORAL at 21:53

## 2017-03-04 RX ADMIN — Medication 81 MILLIGRAM(S): at 12:10

## 2017-03-04 NOTE — PROGRESS NOTE ADULT - SUBJECTIVE AND OBJECTIVE BOX
INTERVAL HPI/OVERNIGHT EVENTS:  Patient sent in from assisted living facility for evaluation of cough and fever. Tmax 101.3F at facility and has been on augmentin for cough for 2 days. Patient is poor historian with hx of dementia but denies any chest pain. does have some dyspnea with wheezing. She states she has cough for three days.  Resting comfortably without distress.    Vital Signs Last 24 Hrs  T(C): 37.2, Max: 37.2 (03-04 @ 16:35)  T(F): 99, Max: 99 (03-04 @ 16:35)  HR: 78 (65 - 84)  BP: 117/60 (108/69 - 117/60)  BP(mean): --  RR: 17 (16 - 18)  SpO2: 95% (92% - 99%)    PHYSICAL EXAM:  GEN:        Awake, responsive and comfortable.  HEENT:    Normal.    RESP:      occasional wheeze.  CVS:          Regular rate and rhythm.   ABD:         Soft, non-tender, non-distended;     MEDICATIONS  (STANDING):  heparin  Injectable 5000Unit(s) SubCutaneous every 12 hours  piperacillin/tazobactam IVPB. 3.375Gram(s) IV Intermittent every 8 hours  aspirin  chewable 81milliGRAM(s) Oral daily  sertraline 25milliGRAM(s) Oral daily  simvastatin 20milliGRAM(s) Oral at bedtime  QUEtiapine 25milliGRAM(s) Oral daily  donepezil 10milliGRAM(s) Oral at bedtime  magnesium oxide 400milliGRAM(s) Oral daily  levothyroxine 75MICROGram(s) Oral daily  levoFLOXacin IVPB 250milliGRAM(s) IV Intermittent every 24 hours  ALBUTerol/ipratropium for Nebulization 3milliLiter(s) Nebulizer every 6 hours  levoFLOXacin IVPB  IV Intermittent     MEDICATIONS  (PRN):  acetaminophen   Tablet 650milliGRAM(s) Oral every 6 hours PRN For Temp greater than 38 C (100.4 F)  guaiFENesin    Syrup 100milliGRAM(s) Oral every 6 hours PRN Cough    LABS:                        12.2   5.4   )-----------( 194      ( 03 Mar 2017 08:54 )             35.5     03 Mar 2017 09:01    140    |  103    |  9      ----------------------------<  129    3.6     |  31     |  0.76     Ca    8.0        03 Mar 2017 09:01  Mg     2.6       03 Mar 2017 09:01    ASSESSMENT AND PLAN:  ·	SOB with wheezing.  ·	hMPV tracheobronchitis.  ·	Doubt bacterial pneumonia.  ·	HTN.  ·	Hypothyroidism.  ·	Dementia.    Continue nebulizer.  Short course of steroids for wheezing.

## 2017-03-04 NOTE — PROGRESS NOTE ADULT - SUBJECTIVE AND OBJECTIVE BOX
Patient is a 89y old  Female who presents with a chief complaint of shortness of breath (28 Feb 2017 02:43)   has  pneumonitis from viral infection. continues to improve    INTERVAL HPI/OVERNIGHT EVENTS:    MEDICATIONS  (STANDING):  heparin  Injectable 5000Unit(s) SubCutaneous every 12 hours  piperacillin/tazobactam IVPB. 3.375Gram(s) IV Intermittent every 8 hours  aspirin  chewable 81milliGRAM(s) Oral daily  sertraline 25milliGRAM(s) Oral daily  simvastatin 20milliGRAM(s) Oral at bedtime  QUEtiapine 25milliGRAM(s) Oral daily  donepezil 10milliGRAM(s) Oral at bedtime  magnesium oxide 400milliGRAM(s) Oral daily  levothyroxine 75MICROGram(s) Oral daily  levoFLOXacin IVPB 250milliGRAM(s) IV Intermittent every 24 hours  ALBUTerol/ipratropium for Nebulization 3milliLiter(s) Nebulizer every 6 hours  levoFLOXacin IVPB  IV Intermittent     MEDICATIONS  (PRN):  acetaminophen   Tablet 650milliGRAM(s) Oral every 6 hours PRN For Temp greater than 38 C (100.4 F)  guaiFENesin    Syrup 100milliGRAM(s) Oral every 6 hours PRN Cough      Allergies    No Known Allergies    Intolerances        REVIEW OF SYSTEMS:  CONSTITUTIONAL: No fever, weight loss, or fatigue  EYES: No eye pain, visual disturbances, or discharge  ENMT:  No difficulty hearing, tinnitus, vertigo; No sinus or throat pain  NECK: No pain or stiffness  BREASTS: No pain, masses, or nipple discharge  RESPIRATORY: No cough, wheezing, chills or hemoptysis; No shortness of breath  CARDIOVASCULAR: No chest pain, palpitations, dizziness, or leg swelling  GASTROINTESTINAL: No abdominal or epigastric pain. No nausea, vomiting, or hematemesis; No diarrhea or constipation. No melena or hematochezia.  GENITOURINARY: No dysuria, frequency, hematuria, or incontinence  NEUROLOGICAL: No headaches, memory loss, loss of strength, numbness, or tremors  SKIN: No itching, burning, rashes, or lesions   LYMPH NODES: No enlarged glands  ENDOCRINE: No heat or cold intolerance; No hair loss  MUSCULOSKELETAL: No joint pain or swelling; No muscle, back, or extremity pain  PSYCHIATRIC: No depression, anxiety, mood swings, or difficulty sleeping  HEME/LYMPH: No easy bruising, or bleeding gums  ALLERY AND IMMUNOLOGIC: No hives or eczema    Vital Signs Last 24 Hrs  T(C): 36.6, Max: 36.6 (03-03 @ 16:59)  T(F): 97.8, Max: 97.9 (03-03 @ 16:59)  HR: 74 (63 - 81)  BP: 109/58 (103/54 - 124/68)  BP(mean): --  RR: 18 (16 - 18)  SpO2: 96% (94% - 99%)    PHYSICAL EXAM:  GENERAL: NAD, well-groomed, well-developed  HEAD:  Atraumatic, Normocephalic  EYES: EOMI, PERRLA, conjunctiva and sclera clear  ENMT: No tonsillar erythema, exudates, or enlargement; Moist mucous membranes, Good dentition, No lesions  NECK: Supple, No JVD, Normal thyroid  NERVOUS SYSTEM:  Alert & Oriented X3, Good concentration; Motor Strength 5/5 B/L upper and lower extremities; DTRs 2+ intact and symmetric  CHEST/LUNG: Clear to percussion bilaterally; No rales, rhonchi, wheezing, or rubs  HEART: Regular rate and rhythm; No murmurs, rubs, or gallops  ABDOMEN: Soft, Nontender, Nondistended; Bowel sounds present  EXTREMITIES:  2+ Peripheral Pulses, No clubbing, cyanosis, or edema  LYMPH: No lymphadenopathy noted  SKIN: No rashes or lesions    LABS:                        12.2   5.4   )-----------( 194      ( 03 Mar 2017 08:54 )             35.5     03 Mar 2017 09:01    140    |  103    |  9      ----------------------------<  129    3.6     |  31     |  0.76     Ca    8.0        03 Mar 2017 09:01  Mg     2.6       03 Mar 2017 09:01            CAPILLARY BLOOD GLUCOSE                RADIOLOGY & ADDITIONAL TESTS:    Imaging Personally Reviewed:  [ ] YES  [ ] NO    Consultant(s) Notes Reviewed:  [ ] YES  [ ] NO    Care Discussed with Consultants/Other Providers [ ] YES  [ ] NO    Care discussed with family,         [  ]   yes  [  ]  No    imp:    stable[ ]    unstable[  ]     improving [  c ]       unchanged  [  ]                Plans:  Continue present plans  [  c] OOB in chair.               New consult [  ]   specialty  .......               order test[  ]    test name.                  Discharge Planning  [ x ]      for Monday

## 2017-03-05 LAB
CULTURE RESULTS: SIGNIFICANT CHANGE UP
CULTURE RESULTS: SIGNIFICANT CHANGE UP
SPECIMEN SOURCE: SIGNIFICANT CHANGE UP
SPECIMEN SOURCE: SIGNIFICANT CHANGE UP

## 2017-03-05 RX ADMIN — Medication 3 MILLILITER(S): at 23:33

## 2017-03-05 RX ADMIN — SERTRALINE 25 MILLIGRAM(S): 25 TABLET, FILM COATED ORAL at 13:06

## 2017-03-05 RX ADMIN — SIMVASTATIN 20 MILLIGRAM(S): 20 TABLET, FILM COATED ORAL at 22:14

## 2017-03-05 RX ADMIN — Medication 75 MICROGRAM(S): at 05:45

## 2017-03-05 RX ADMIN — Medication 3 MILLILITER(S): at 00:08

## 2017-03-05 RX ADMIN — Medication 20 MILLIGRAM(S): at 05:45

## 2017-03-05 RX ADMIN — DONEPEZIL HYDROCHLORIDE 10 MILLIGRAM(S): 10 TABLET, FILM COATED ORAL at 22:14

## 2017-03-05 RX ADMIN — HEPARIN SODIUM 5000 UNIT(S): 5000 INJECTION INTRAVENOUS; SUBCUTANEOUS at 05:44

## 2017-03-05 RX ADMIN — HEPARIN SODIUM 5000 UNIT(S): 5000 INJECTION INTRAVENOUS; SUBCUTANEOUS at 18:00

## 2017-03-05 RX ADMIN — QUETIAPINE FUMARATE 25 MILLIGRAM(S): 200 TABLET, FILM COATED ORAL at 13:06

## 2017-03-05 RX ADMIN — Medication 20 MILLIGRAM(S): at 13:07

## 2017-03-05 RX ADMIN — Medication 0.5 MILLIGRAM(S): at 17:31

## 2017-03-05 RX ADMIN — MAGNESIUM OXIDE 400 MG ORAL TABLET 400 MILLIGRAM(S): 241.3 TABLET ORAL at 13:06

## 2017-03-05 RX ADMIN — Medication 100 MILLIGRAM(S): at 16:39

## 2017-03-05 RX ADMIN — PIPERACILLIN AND TAZOBACTAM 25 GRAM(S): 4; .5 INJECTION, POWDER, LYOPHILIZED, FOR SOLUTION INTRAVENOUS at 05:44

## 2017-03-05 RX ADMIN — Medication 3 MILLILITER(S): at 06:28

## 2017-03-05 RX ADMIN — Medication 20 MILLIGRAM(S): at 22:14

## 2017-03-05 RX ADMIN — Medication 81 MILLIGRAM(S): at 13:07

## 2017-03-05 RX ADMIN — Medication 3 MILLILITER(S): at 17:14

## 2017-03-05 NOTE — DISCHARGE NOTE ADULT - PROVIDER TOKENS
FREE:[LAST:[Dr babin],PHONE:[(   )    -],FAX:[(   )    -],ADDRESS:[[facility]

## 2017-03-05 NOTE — PROGRESS NOTE ADULT - SUBJECTIVE AND OBJECTIVE BOX
INTERVAL HPI/OVERNIGHT EVENTS:  Patient sent in from assisted living facility for evaluation of cough and fever. Tmax 101.3F at facility and has been on augmentin for cough for 2 days. Patient is poor historian with hx of dementia but denies any chest pain. does have some dyspnea with wheezing. She states she has cough for three days.  Awake and comfortably without distress.    Vital Signs Last 24 Hrs  T(C): 37.1, Max: 37.1 (03-05 @ 17:00)  T(F): 98.8, Max: 98.8 (03-05 @ 17:00)  HR: 83 (65 - 83)  BP: 108/75 (108/75 - 129/70)  BP(mean): --  RR: 18 (17 - 18)  SpO2: 98% (93% - 98%)    PHYSICAL EXAM:  GEN:         Awake, responsive and comfortable.  HEENT:    Normal.    RESP:     no wheezing.  CVS:         Regular rate and rhythm.   ABD:         Soft, non-tender, non-distended;     MEDICATIONS  (STANDING):  heparin  Injectable 5000Unit(s) SubCutaneous every 12 hours  aspirin  chewable 81milliGRAM(s) Oral daily  sertraline 25milliGRAM(s) Oral daily  simvastatin 20milliGRAM(s) Oral at bedtime  QUEtiapine 25milliGRAM(s) Oral daily  donepezil 10milliGRAM(s) Oral at bedtime  magnesium oxide 400milliGRAM(s) Oral daily  levothyroxine 75MICROGram(s) Oral daily  ALBUTerol/ipratropium for Nebulization 3milliLiter(s) Nebulizer every 6 hours  methylPREDNISolone sodium succinate Injectable 20milliGRAM(s) IV Push every 8 hours    MEDICATIONS  (PRN):  acetaminophen   Tablet 650milliGRAM(s) Oral every 6 hours PRN For Temp greater than 38 C (100.4 F)  guaiFENesin    Syrup 100milliGRAM(s) Oral every 6 hours PRN Cough    ASSESSMENT AND PLAN:  ·	SOB with wheezing.  ·	hMPV tracheobronchitis.  ·	Doubt bacterial pneumonia.  ·	HTN.  ·	Hypothyroidism.  ·	Dementia.    Continue O2, nebulizer and steroids.  SPO2 on room air before discharge for O2 need.

## 2017-03-05 NOTE — DISCHARGE NOTE ADULT - HOSPITAL COURSE
patient from assited living was admitted for  fever and mild wheezing. her work up revealed only  viral etiology and she was diagnosed with tracheobronchitis with wheezing. She was emperically palced on  ABX and  bronchodilators, and needed steroids for wheezing. she improved. There was no pneumonia. . She will be dischargedto assisted living at request of family.

## 2017-03-05 NOTE — PROGRESS NOTE ADULT - SUBJECTIVE AND OBJECTIVE BOX
Patient is a 89y old  Female who presents with a chief complaint of shortness of breath (28 Feb 2017 02:43)  improved. no wheezing today.   on prednisone Iv      INTERVAL HPI/OVERNIGHT EVENTS:uneventful. no distress.     MEDICATIONS  (STANDING):  heparin  Injectable 5000Unit(s) SubCutaneous every 12 hours  aspirin  chewable 81milliGRAM(s) Oral daily  sertraline 25milliGRAM(s) Oral daily  simvastatin 20milliGRAM(s) Oral at bedtime  QUEtiapine 25milliGRAM(s) Oral daily  donepezil 10milliGRAM(s) Oral at bedtime  magnesium oxide 400milliGRAM(s) Oral daily  levothyroxine 75MICROGram(s) Oral daily  ALBUTerol/ipratropium for Nebulization 3milliLiter(s) Nebulizer every 6 hours  methylPREDNISolone sodium succinate Injectable 20milliGRAM(s) IV Push every 8 hours    MEDICATIONS  (PRN):  acetaminophen   Tablet 650milliGRAM(s) Oral every 6 hours PRN For Temp greater than 38 C (100.4 F)  guaiFENesin    Syrup 100milliGRAM(s) Oral every 6 hours PRN Cough      Allergies    No Known Allergies    Intolerances        REVIEW OF SYSTEMS:  CONSTITUTIONAL: No fever, weight loss, or fatigue  EYES: No eye pain, visual disturbances, or discharge  ENMT:  No difficulty hearing, tinnitus, vertigo; No sinus or throat pain  NECK: No pain or stiffness  BREASTS: No pain, masses, or nipple discharge  RESPIRATORY: No cough, wheezing, chills or hemoptysis; No shortness of breath  CARDIOVASCULAR: No chest pain, palpitations, dizziness, or leg swelling  GASTROINTESTINAL: No abdominal or epigastric pain. No nausea, vomiting, or hematemesis; No diarrhea or constipation. No melena or hematochezia.  GENITOURINARY: No dysuria, frequency, hematuria, or incontinence  NEUROLOGICAL: No headaches, memory loss, loss of strength, numbness, or tremors  SKIN: No itching, burning, rashes, or lesions   LYMPH NODES: No enlarged glands  ENDOCRINE: No heat or cold intolerance; No hair loss  MUSCULOSKELETAL: No joint pain or swelling; No muscle, back, or extremity pain  PSYCHIATRIC: No depression, anxiety, mood swings, or difficulty sleeping  HEME/LYMPH: No easy bruising, or bleeding gums  ALLERY AND IMMUNOLOGIC: No hives or eczema    Vital Signs Last 24 Hrs  T(C): 36.9, Max: 37.2 (03-04 @ 16:35)  T(F): 98.4, Max: 99 (03-04 @ 16:35)  HR: 77 (65 - 84)  BP: 121/66 (108/69 - 121/66)  BP(mean): --  RR: 17 (16 - 17)  SpO2: 98% (92% - 98%)    PHYSICAL EXAM:  GENERAL: NAD, well-groomed, well-developed  HEAD:  Atraumatic, Normocephalic  EYES: EOMI, PERRLA, conjunctiva and sclera clear  ENMT: No tonsillar erythema, exudates, or enlargement; Moist mucous membranes, Good dentition, No lesions  NECK: Supple, No JVD, Normal thyroid  NERVOUS SYSTEM: alert. Demented; Motor Strength 5/5 B/L upper and lower extremities; DTRs 2+ intact and symmetric  CHEST/LUNG: Clear to percussion bilaterally; No rales, rhonchi, wheezing, or rubs  HEART: Regular rate and rhythm; No murmurs, rubs, or gallops  ABDOMEN: Soft, Nontender, Nondistended; Bowel sounds present  EXTREMITIES:  2+ Peripheral Pulses, No clubbing, cyanosis, or edema  LYMPH: No lymphadenopathy noted  SKIN: No rashes or lesions    LABS:                CAPILLARY BLOOD GLUCOSE                RADIOLOGY & ADDITIONAL TESTS:    Imaging Personally Reviewed:  [ ] YES  [ ] NO    Consultant(s) Notes Reviewed:  [ ] YES  [ ] NO    Care Discussed with Consultants/Other Providers [ ] YES  [ ] NO    Care discussed with family,         [  ]   yes  [  ]  No    imp:    stable[x ]    unstable[  ]     improving [ x  ]       unchanged  [  ]                Plans:  Continue present plans  [x  ]               New consult [  ]   specialty  .......               order test[  ]    test name.  cbc, BMP                Discharge Planning  [  ]

## 2017-03-05 NOTE — PROGRESS NOTE ADULT - ASSESSMENT
fever-resolved. URi- resolved. wheezing resolved.   dementia.  Discharge tomorrow. will stop abx a nd observe. start bacid

## 2017-03-05 NOTE — DISCHARGE NOTE ADULT - NS AS DC HF EDUCATION INSTRUCTIONS
Low salt diet/Activities as tolerated/Monitor Weight Daily/Report weight gain of 2 or more pounds in one day or 3 or more pounds in one week, worsening shortness of breath, fatigue, weakness, increased swelling of hands and feet to primary care provider/Call Primary Care Provider for follow-up after discharge

## 2017-03-05 NOTE — DISCHARGE NOTE ADULT - MEDICATION SUMMARY - MEDICATIONS TO TAKE
I will START or STAY ON the medications listed below when I get home from the hospital:    aspirin 81 mg oral tablet  -- 1 tab(s) by mouth once a day  -- Indication: For cad preveention    calcium carbonate 600 mg oral tablet  -- 600 milligram(s) by mouth 2 times a day  -- Indication: For gerd    Zoloft 25 mg oral tablet  -- 1 tab(s) by mouth once a day  -- Indication: For Depression    simvastatin 20 mg oral tablet  -- 1 tab(s) by mouth once a day (at bedtime)  -- Indication: For HLD    Seroquel 25 mg oral tablet  -- 25  by mouth 2 times a day  -- Indication: For Depresion    Fosamax 70 mg oral tablet  -- 1 tab(s) by mouth once a week  -- Indication: For osteoporosis    donepezil 10 mg oral tablet  -- 1 tab(s) by mouth once a day (at bedtime)  -- Indication: For Dementia    guaiFENesin 100 mg/5 mL oral liquid  -- 5 milliliter(s) by mouth every 6 hours, As needed, Cough  -- Indication: For cough as needed    magnesium oxide 500 mg oral tablet  -- 1 tab(s) by mouth once a day  -- Indication: For supplement    Fish Oil 1000 mg oral capsule  -- 2000 milligram(s) by mouth once a day  -- Indication: For supplement    Fish Oil 1000 mg oral capsule  -- 1000 milligram(s) by mouth once a day (at bedtime)  -- Indication: For supplement    levothyroxine 75 mcg (0.075 mg) oral tablet  -- 1 tab(s) by mouth once a day  -- Indication: For Hypothyroidism    cyanocobalamin 500 mcg oral tablet  -- 1000 milligram(s) by mouth once a day  -- Indication: For supplement    Vitamin D3 1000 intl units oral tablet  -- 1 tab(s) by mouth once a day  -- Indication: For supplement

## 2017-03-05 NOTE — DISCHARGE NOTE ADULT - CARE PLAN
Principal Discharge DX:	Tracheobronchitis  Goal:	rsolved  Instructions for follow-up, activity and diet:	as tolerated. dash diet  Secondary Diagnosis:	Wheezing  Goal:	resolvedrsolved  Secondary Diagnosis:	Fever  Secondary Diagnosis:	H/O knee surgery  Secondary Diagnosis:	Hypothyroid  Secondary Diagnosis:	Dementia Principal Discharge DX:	Tracheobronchitis  Goal:	resolved  Instructions for follow-up, activity and diet:	as tolerated. dash diet  Secondary Diagnosis:	Wheezing  Goal:	resolvedrsolved  Secondary Diagnosis:	Fever  Goal:	resolved  Secondary Diagnosis:	H/O knee surgery  Secondary Diagnosis:	Hypothyroid  Secondary Diagnosis:	Dementia

## 2017-03-05 NOTE — DISCHARGE NOTE ADULT - PATIENT PORTAL LINK FT
“You can access the Follow[facility] Patient Portal, offered by [facility], by registering with the following website: [email or web address]”

## 2017-03-06 VITALS — OXYGEN SATURATION: 92 %

## 2017-03-06 LAB
ANION GAP SERPL CALC-SCNC: 11 MMOL/L — SIGNIFICANT CHANGE UP (ref 5–17)
BUN SERPL-MCNC: 18 MG/DL — SIGNIFICANT CHANGE UP (ref 7–23)
CALCIUM SERPL-MCNC: 9 MG/DL — SIGNIFICANT CHANGE UP (ref 8.5–10.1)
CHLORIDE SERPL-SCNC: 99 MMOL/L — SIGNIFICANT CHANGE UP (ref 96–108)
CO2 SERPL-SCNC: 27 MMOL/L — SIGNIFICANT CHANGE UP (ref 22–31)
CREAT SERPL-MCNC: 1.12 MG/DL — SIGNIFICANT CHANGE UP (ref 0.5–1.3)
GLUCOSE SERPL-MCNC: 242 MG/DL — HIGH (ref 70–99)
HCT VFR BLD CALC: 38.5 % — SIGNIFICANT CHANGE UP (ref 34.5–45)
HGB BLD-MCNC: 13.2 G/DL — SIGNIFICANT CHANGE UP (ref 11.5–15.5)
MCHC RBC-ENTMCNC: 32.2 PG — SIGNIFICANT CHANGE UP (ref 27–34)
MCHC RBC-ENTMCNC: 34.2 GM/DL — SIGNIFICANT CHANGE UP (ref 32–36)
MCV RBC AUTO: 94 FL — SIGNIFICANT CHANGE UP (ref 80–100)
PLATELET # BLD AUTO: 284 K/UL — SIGNIFICANT CHANGE UP (ref 150–400)
POTASSIUM SERPL-MCNC: 3.9 MMOL/L — SIGNIFICANT CHANGE UP (ref 3.5–5.3)
POTASSIUM SERPL-SCNC: 3.9 MMOL/L — SIGNIFICANT CHANGE UP (ref 3.5–5.3)
RBC # BLD: 4.1 M/UL — SIGNIFICANT CHANGE UP (ref 3.8–5.2)
RBC # FLD: 12.7 % — SIGNIFICANT CHANGE UP (ref 11–15)
SODIUM SERPL-SCNC: 137 MMOL/L — SIGNIFICANT CHANGE UP (ref 135–145)
WBC # BLD: 7.8 K/UL — SIGNIFICANT CHANGE UP (ref 3.8–10.5)
WBC # FLD AUTO: 7.8 K/UL — SIGNIFICANT CHANGE UP (ref 3.8–10.5)

## 2017-03-06 RX ORDER — LEVOTHYROXINE SODIUM 125 MCG
1 TABLET ORAL
Qty: 30 | Refills: 0
Start: 2017-03-06 | End: 2017-04-05

## 2017-03-06 RX ORDER — ASPIRIN/CALCIUM CARB/MAGNESIUM 324 MG
1 TABLET ORAL
Qty: 30 | Refills: 0 | OUTPATIENT
Start: 2017-03-06 | End: 2017-04-05

## 2017-03-06 RX ORDER — ASPIRIN/CALCIUM CARB/MAGNESIUM 324 MG
1 TABLET ORAL
Qty: 30 | Refills: 0
Start: 2017-03-06 | End: 2017-04-05

## 2017-03-06 RX ORDER — HALOPERIDOL DECANOATE 100 MG/ML
1 INJECTION INTRAMUSCULAR ONCE
Qty: 0 | Refills: 0 | Status: COMPLETED | OUTPATIENT
Start: 2017-03-06 | End: 2017-03-06

## 2017-03-06 RX ADMIN — Medication 75 MICROGRAM(S): at 06:33

## 2017-03-06 RX ADMIN — Medication 3 MILLILITER(S): at 06:20

## 2017-03-06 RX ADMIN — HALOPERIDOL DECANOATE 1 MILLIGRAM(S): 100 INJECTION INTRAMUSCULAR at 08:51

## 2017-03-06 RX ADMIN — Medication 3 MILLILITER(S): at 17:11

## 2017-03-06 RX ADMIN — SERTRALINE 25 MILLIGRAM(S): 25 TABLET, FILM COATED ORAL at 11:57

## 2017-03-06 RX ADMIN — Medication 20 MILLIGRAM(S): at 13:11

## 2017-03-06 RX ADMIN — Medication 81 MILLIGRAM(S): at 11:57

## 2017-03-06 RX ADMIN — Medication 3 MILLILITER(S): at 11:24

## 2017-03-06 RX ADMIN — QUETIAPINE FUMARATE 25 MILLIGRAM(S): 200 TABLET, FILM COATED ORAL at 11:57

## 2017-03-06 RX ADMIN — HEPARIN SODIUM 5000 UNIT(S): 5000 INJECTION INTRAVENOUS; SUBCUTANEOUS at 06:33

## 2017-03-06 RX ADMIN — Medication 20 MILLIGRAM(S): at 06:36

## 2017-03-06 RX ADMIN — MAGNESIUM OXIDE 400 MG ORAL TABLET 400 MILLIGRAM(S): 241.3 TABLET ORAL at 11:57

## 2017-03-06 NOTE — DIETITIAN INITIAL EVALUATION ADULT. - OTHER INFO
Pt seen for length of stay .  Pt. consumed <25% of breakfast this morning.  Food preferences obtained from [facility], pt. dislikes orange juice & prefers half & half c coffee.

## 2017-03-06 NOTE — DIETITIAN INITIAL EVALUATION ADULT. - PERTINENT LABORATORY DATA
03-06 Na137 mmol/L Glu 242 mg/dL<H> K+ 3.9 mmol/L Cr  1.12 mg/dL BUN 18 mg/dL EstGFR 42 mL/min/1.73M2<L> Ca 9.0 mg/dL  Hgb 13.2 g/dL Hct 38.5 % 02-29 serum pro-brain natriuretic peptide 2281 pg/dL Alb 3.2g/dL<L> TSH 4.500ng/ml<H>

## 2017-03-06 NOTE — DIETITIAN INITIAL EVALUATION ADULT. - NS AS NUTRI INTERV MEDICAL AND FOOD SUPPLEMENTS
Recommend Glucerna Shake(due to elevated glucose) 3 x daily = 600 calories, 30 grams protein daily/Commercial beverage

## 2017-03-06 NOTE — DIETITIAN INITIAL EVALUATION ADULT. - PHYSICAL APPEARANCE
obese/34.8(3/1), as per [facility], last wt. was 212 Lbs , as per transfer sheet wt. 207 Lbs listed (? date of wt.)/other (specify)

## 2017-03-06 NOTE — DIETITIAN INITIAL EVALUATION ADULT. - NS AS NUTRI INTERV COLLABORAT
Recommend MD order swallow evaluation to assess appropriate consistency of diet & with hx of vomiting after meals/Collaboration with other providers

## 2017-03-06 NOTE — DIETITIAN INITIAL EVALUATION ADULT. - ENERGY NEEDS
Height (cm): 167.6 (02-28)  Weight (kg): 100.9 (02-28)  BMI (kg/m2): 35.9 (02-28)  IBW:      % IBW:             UBW:              %UBW

## 2017-03-25 NOTE — CHART NOTE - NSCHARTNOTEFT_GEN_A_CORE
89 year old female was admitted with cough and dyspnea. . She had HMPV  infection causing tracheobronchitis and needed bronchodilators and prednisone. Sepsis was ruled out and was not a consideration. . She also did have stable cardiac afunction and was not in heart Failure.

## 2018-03-19 NOTE — PHYSICAL THERAPY INITIAL EVALUATION ADULT - PHYSICAL ASSIST/NONPHYSICAL ASSIST: GAIT, REHAB EVAL
Providers


Date of admission: 


02/14/18 16:54





Expected date of discharge: 02/17/18


Attending physician: 


Jimob España





Consults: 





 





02/14/18 16:54


Consult Physician Stat 


   Consulting Provider: Tanner Zuñiga


   Consult Reason/Comments: Severe COPD


   Do you want consulting provider notified?: Yes


Consult Physician Stat 


   Consulting Provider: Angelica Correa


   Consult Reason/Comments: Syncope, altered mental status


   Do you want consulting provider notified?: Yes











Primary care physician: 


Parmjit Roe





Hospital Course: 





Discharge diagnosis


Altered mental status and found on floor.  Resolved now.  Unlikely 

encephalopathy.


Syncope likely due to dehydration and volume depletion and infection.  Ruled 

out acute CVA. CT head negative


Acute influenza A infection


History of severe peripheral vascular disease and multiple aortic and femoral 

surgeries


COPD with exacerbation


Fibromyalgia and chronic pain


Hyperlipidemia


Hypertension


Osteoarthritis and history of cervical fusion


History of right foot gangrene


History of right carotid endarterectomy


DVT prophylaxis





Hospital course





Patient is a 60-year-old female with a known history of COPD not on home oxygen

, fibromyalgia, chronic pain, and severe peripheral vascular disease with 

history of I aortic and femoral popliteal surgeries was found on the kitchen 

floor by her  445 this morning she was quite altered mental status she 

was confused she Repeating things and family wanted to bring her to the 

hospital and she refused to come to the hospital at this state continued for 

about 5-6 hours when finally she agreed to come to the hospital.  Patient has 

been cough and cold for the past 2-3 days. she is complaining about shortness 

of breath and then she spiked a fever and her fever was 102.3 in the ER, 

complaining about chest pain complaining about shortness of breath and she has 

been coughing. no abdominal pain no frequency urgency dysuria.


Patient says that she might have sleep apnea on the floor and has been having 

some left ankle pain and otherwise denied any head injury or any other pain.  

Denied any history of seizures.  No history of CVA in the past.  No bladder or 

bowel incontinence.


Chest x-ray showed COPD and possible interstitial pneumonitis


CT head showed no acute intracranial process.





02/15/2018


Patient did improve clinically otherwise patient is still requiring oxygen by 

another cannula.  Patient has been afebrile now.  No commerce of chest pain or 

worsening shortness of breath.  Otherwise patient is eager to be discharged 

home.


X-ray of thoracic spine showed posterior sixth rib possible fracture.





02/16/2018


Patient's status improved today.  Shortness of breath with ambulation.  

Otherwise saturating well on room air.  Continues to be on IV steroids.  

Anticipate discharge tomorrow with tapping steroids and Tamiflu course.  

Patient will need to follow with pulmonary clinic.  Otherwise patient wants to 

be discharged.








02/17/2018


Patient did improve clinically.  No complaints of chest pain or shortness of 

breath.  Patient is stable to be discharged.








Discharge physical examination was done and vitals reviewed.


Patient Condition at Discharge: Fair





Plan - Discharge Summary


Discharge Rx Participant: Yes


New Discharge Prescriptions: 


New


   Levofloxacin [Levaquin] 500 mg PO DAILY@1400 #3 tab


   Oseltamivir [Tamiflu] 75 mg PO Q12HR #4 cap


   predniSONE See Taper PO DAILY #30 tab





Continue


   Cetirizine HCl [Zyrtec] 10 mg PO DAILY


   Pravastatin Sodium [Pravachol] 20 mg PO DAILY


   Omeprazole [PriLOSEC] 20 mg PO BID


   Morphine Sulfate [Morphine Sulfate ER] 30 mg PO DAILY PRN


     PRN Reason: Pain


   Hydrochlorothiazide 25 mg PO DAILY


   Carvedilol 25 mg PO BID


   Losartan Potassium 100 mg PO DAILY


   Clopidogrel [Plavix] 75 mg PO DAILY #30 tab


   Ipratropium Bromide [Atrovent Hfa] 2 puff INHALATION RT-QID


   Baclofen [Lioresal] 10 mg PO TID PRN


     PRN Reason: Pain


   Depo-Medrol Injection 80 mg SQ ONCE


   Fluticasone Propionate [Flovent Hfa 110mcg] 1 puff INHALATION RT-BID


   Ipratropium-Albuterol Nebulize [Duoneb 0.5 mg-3 mg/3 ml Soln] 3 ml 

INHALATION RT-QID PRN


     PRN Reason: sob


   oxyCODONE-APAP 5-325MG [Percocet 5-325 mg] 1 tab PO DAILY PRN


     PRN Reason: Pain





Discontinued


   Azithromycin [Zithromax Z-pack] See Taper PO AS DIRECTED


   predniSONE 20 mg PO DAILY


Discharge Medication List





Carvedilol 25 mg PO BID 12/11/15 [History]


Cetirizine HCl [Zyrtec] 10 mg PO DAILY 12/11/15 [History]


Hydrochlorothiazide 25 mg PO DAILY 12/11/15 [History]


Losartan Potassium 100 mg PO DAILY 12/11/15 [History]


Morphine Sulfate [Morphine Sulfate ER] 30 mg PO DAILY PRN 12/11/15 [History]


Omeprazole [PriLOSEC] 20 mg PO BID 12/11/15 [History]


Pravastatin Sodium [Pravachol] 20 mg PO DAILY 12/11/15 [History]


Clopidogrel [Plavix] 75 mg PO DAILY #30 tab 12/17/15 [Rx]


Ipratropium Bromide [Atrovent Hfa] 2 puff INHALATION RT-QID 05/09/16 [History]


Baclofen [Lioresal] 10 mg PO TID PRN 02/14/18 [History]


Depo-Medrol Injection 80 mg SQ ONCE 02/14/18 [History]


Fluticasone Propionate [Flovent Hfa 110mcg] 1 puff INHALATION RT-BID 02/14/18 [

History]


Ipratropium-Albuterol Nebulize [Duoneb 0.5 mg-3 mg/3 ml Soln] 3 ml INHALATION RT

-QID PRN 02/14/18 [History]


oxyCODONE-APAP 5-325MG [Percocet 5-325 mg] 1 tab PO DAILY PRN 02/14/18 [History]


Levofloxacin [Levaquin] 500 mg PO DAILY@1400 #3 tab 02/17/18 [Rx]


Oseltamivir [Tamiflu] 75 mg PO Q12HR #4 cap 02/17/18 [Rx]


predniSONE See Taper PO DAILY #30 tab 02/17/18 [Rx]








Follow up Appointment(s)/Referral(s): 


Parmjit Roe MD [Primary Care Provider] - 1-2 days


Tanner Zuñiga MD [STAFF PHYSICIAN] - 1 Week


Patient Instructions/Handouts:  COPD (Chronic Obstructive Pulmonary Disease) (DC

)


Activity/Diet/Wound Care/Special Instructions: 


NO SMOKING. REFRAIN FROM SMALL CHILDREN UNTIL TAMIFLU IS FINISHED. 


Discharge Disposition: HOME SELF-CARE
2 person assist

## 2018-03-26 ENCOUNTER — EMERGENCY (EMERGENCY)
Facility: HOSPITAL | Age: 83
LOS: 1 days | Discharge: HOME | End: 2018-03-26
Attending: EMERGENCY MEDICINE | Admitting: EMERGENCY MEDICINE
Payer: MEDICARE

## 2018-03-26 VITALS
OXYGEN SATURATION: 95 % | SYSTOLIC BLOOD PRESSURE: 124 MMHG | HEART RATE: 62 BPM | DIASTOLIC BLOOD PRESSURE: 58 MMHG | TEMPERATURE: 98 F | RESPIRATION RATE: 18 BRPM

## 2018-03-26 VITALS
RESPIRATION RATE: 16 BRPM | DIASTOLIC BLOOD PRESSURE: 51 MMHG | HEART RATE: 74 BPM | SYSTOLIC BLOOD PRESSURE: 133 MMHG | OXYGEN SATURATION: 96 %

## 2018-03-26 DIAGNOSIS — Z98.890 OTHER SPECIFIED POSTPROCEDURAL STATES: Chronic | ICD-10-CM

## 2018-03-26 PROCEDURE — 71045 X-RAY EXAM CHEST 1 VIEW: CPT | Mod: 26

## 2018-03-26 PROCEDURE — 99284 EMERGENCY DEPT VISIT MOD MDM: CPT

## 2018-03-26 PROCEDURE — 73030 X-RAY EXAM OF SHOULDER: CPT | Mod: 26,LT

## 2018-03-26 PROCEDURE — 73630 X-RAY EXAM OF FOOT: CPT | Mod: 26,RT

## 2018-03-26 PROCEDURE — 73502 X-RAY EXAM HIP UNI 2-3 VIEWS: CPT | Mod: 26,RT

## 2018-03-26 RX ORDER — ACETAMINOPHEN 500 MG
650 TABLET ORAL ONCE
Qty: 0 | Refills: 0 | Status: COMPLETED | OUTPATIENT
Start: 2018-03-26 | End: 2018-03-26

## 2018-03-26 RX ADMIN — Medication 650 MILLIGRAM(S): at 20:36

## 2018-03-26 NOTE — ED PROVIDER NOTE - PHYSICAL EXAMINATION
MSK: tenderness to R hip but full ROM, able to lift leg  -hematoma over dorsum of R foot (tender), symmetric and full pulses, cap refill <2 sec  -full ROM b/l LE

## 2018-03-26 NOTE — ED ADULT NURSE REASSESSMENT NOTE - NS ED NURSE REASSESS COMMENT FT1
Received patient report from JACK Meier @2030.  Patient presents to ED from nursing home s/p fall.  Staff was moving patient up in the bed and she slipped down and off to the side.  She appears comfortable and in no apparent distress.  Patient complaining of pain to lower extremities.  History of dementia and not able to provide further medical information.  Vitals taken and will continue to monitor patient.

## 2018-03-26 NOTE — ED PROVIDER NOTE - PROGRESS NOTE DETAILS
Granddaughter at bedside to transport patient home. Given copies of results, podiatry f/u, placed in hard sole shoe. Will dc home with return precautions.

## 2018-03-26 NOTE — ED PROVIDER NOTE - ATTENDING CONTRIBUTION TO CARE
DR. SANDOVAL, ATTENDING MD-  I performed a face to face bedside interview with patient regarding history of present illness, review of symptoms and past medical history. I completed an independent physical exam.  I have discussed patient's plan of care with the resident.   Documentation as above in the note.    Chandana: elderly female with h/o dementia, s/p mechanical fall yesterday, as described above, no head trauma.  Since that time was noted to have decreased left arm movement as well as bruising/pain to rt foot.  On my exam, pleasantly demented, awake, alert, examined head to toe.  No evidence of head trauma, neuro exam afocal, no deformities or localized pain to left shoulder/arm, did have some poorly localized tenderness to bilateral hips, but I was able to range of motion both hips without significant discomfort, rt foot with bruising to mid-foot dorsum.  Plan for xrays, re-assess

## 2018-03-26 NOTE — ED ADULT TRIAGE NOTE - CHIEF COMPLAINT QUOTE
Arrives via EMS for unwitnessed fall with granddaughter yesterday, 3/25/2018.  Pt here today for evaluation of pain and hematoma to top of right foot.  Positive pedal pulse noted, and hematoma.  Pt with dementia. denies any pain.  Staff endorses early c/o left arm pain and right hip pain

## 2018-03-26 NOTE — ED ADULT NURSE NOTE - OBJECTIVE STATEMENT
Pt presents to ED R#1 Aox2, in NAD, brought in from alf home accompanied by HHA for c/o ecchymosis to R foot with R hip pain s/p slip off stretcher yesterday, has Pt presents to ED R#1 Aox2, in NAD, brought in from senior care home accompanied by HHA for c/o ecchymosis to R foot with R hip pain s/p slip off stretcher yesterday, as per aid pt has been hitting foot against bedside table today causing foot echymosis. Healing echymosis noted to R upper arm, as per HHA , d/t daughter lifting pt last week. HHA denies fall/ LOC/ head injury. Skin is intact. Pt is in NAD, respirations even and unlabored. VSS. Seen by MD. Awaiting XRAys. Will continue to monitor.

## 2018-03-26 NOTE — ED PROVIDER NOTE - MEDICAL DECISION MAKING DETAILS
89F with various MSK complaints s/p mech low risk fall, no LOC, did not hit head. Obtain XRays and reassess.

## 2018-03-26 NOTE — ED PROVIDER NOTE - OBJECTIVE STATEMENT
89F PMH Alzheimer's p/w home health aide s/p mechanical fall yesterday. Granddaughter was helping pt up from bed and pt slid to floor. Walks with walker and assistance at baseline. c/o R hip pain, R foot hematoma/pain (hit foot on table repeatedly trying to get up). No other injuries. Pt notes some chronic lower back pain.

## 2018-03-27 PROBLEM — F03.90 UNSPECIFIED DEMENTIA, UNSPECIFIED SEVERITY, WITHOUT BEHAVIORAL DISTURBANCE, PSYCHOTIC DISTURBANCE, MOOD DISTURBANCE, AND ANXIETY: Chronic | Status: ACTIVE | Noted: 2017-02-27

## 2018-03-27 PROBLEM — I10 ESSENTIAL (PRIMARY) HYPERTENSION: Chronic | Status: ACTIVE | Noted: 2017-02-27

## 2018-03-27 PROBLEM — E03.9 HYPOTHYROIDISM, UNSPECIFIED: Chronic | Status: ACTIVE | Noted: 2017-02-27

## 2019-10-21 ENCOUNTER — APPOINTMENT (OUTPATIENT)
Dept: INTERNAL MEDICINE | Facility: CLINIC | Age: 84
End: 2019-10-21
Payer: MEDICARE

## 2019-10-21 DIAGNOSIS — M81.0 AGE-RELATED OSTEOPOROSIS W/OUT CURRENT PATHOLOGICAL FRACTURE: ICD-10-CM

## 2019-10-21 DIAGNOSIS — F41.9 ANXIETY DISORDER, UNSPECIFIED: ICD-10-CM

## 2019-10-21 DIAGNOSIS — F32.9 MAJOR DEPRESSIVE DISORDER, SINGLE EPISODE, UNSPECIFIED: ICD-10-CM

## 2019-10-21 DIAGNOSIS — Z23 ENCOUNTER FOR IMMUNIZATION: ICD-10-CM

## 2019-10-21 DIAGNOSIS — E53.8 DEFICIENCY OF OTHER SPECIFIED B GROUP VITAMINS: ICD-10-CM

## 2019-10-21 PROCEDURE — 99204 OFFICE O/P NEW MOD 45 MIN: CPT | Mod: 25

## 2019-10-21 PROCEDURE — 90662 IIV NO PRSV INCREASED AG IM: CPT

## 2019-10-21 PROCEDURE — G0008: CPT

## 2019-10-21 RX ORDER — ASCORBIC ACID 500 MG
TABLET ORAL DAILY
Qty: 30 | Refills: 0 | Status: ACTIVE | COMMUNITY
Start: 2019-10-21

## 2019-10-21 NOTE — PHYSICAL EXAM
[Well Nourished] : well nourished [Well Developed] : well developed [Well-Appearing] : well-appearing [Normal] : normal rate, regular rhythm, normal S1 and S2 and no murmur heard [No Carotid Bruits] : no carotid bruits [Pedal Pulses Present] : the pedal pulses are present [Soft] : abdomen soft [Normal Supraclavicular Nodes] : no supraclavicular lymphadenopathy [Normal Posterior Cervical Nodes] : no posterior cervical lymphadenopathy [Normal Anterior Cervical Nodes] : no anterior cervical lymphadenopathy [No CVA Tenderness] : no CVA  tenderness [de-identified] : Cooperative [de-identified] : 1+ edema [de-identified] : Verbal cand confused.

## 2019-10-21 NOTE — HEALTH RISK ASSESSMENT
[Good] : ~his/her~ current health as good [Yes] : Yes [1 or 2 (0 pts)] : 1 or 2 (0 points) [Monthly or less (1 pt)] : Monthly or less (1 point) [No falls in past year] : Patient reported no falls in the past year [No] : In the past 12 months have you used drugs other than those required for medical reasons? No [Never (0 pts)] : Never (0 points) [1] : 1) Little interest or pleasure doing things for several days (1) [Audit-CScore] : 1 [] : No [FreeTextEntry1] : OMS with anxiety and depression [[id number]] : 2

## 2019-10-21 NOTE — HISTORY OF PRESENT ILLNESS
[FreeTextEntry1] : New 89 yo pt.  Disch from [place] [facility] to home with dtr and 24 hr HHAs.

## 2019-10-21 NOTE — REVIEW OF SYSTEMS
[Incontinence] : incontinence [Negative] : Integumentary [Melena] : no melena [FreeTextEntry3] : Glasses prn reading [FreeTextEntry2] : see HPI [FreeTextEntry9] : Transfers with assistance.  [FreeTextEntry4] : Lost upper and lower dentures at [facility] [de-identified] : see HPI

## 2019-10-21 NOTE — ASSESSMENT
[FreeTextEntry1] : 89 with OMS depression and anxiety.  Moved from [facility] to granddaughter home.  Disch 1 mo ago.  Has 24 hr HHAs.  On Seroquel. \par Hypothyroid\par Needs all avail home care services and maint PT/OT.  Wheelchair bound.  Needs assistance for all ADLs.  \par Needs new dentures which is in progress.  They were lost at .  \par \par

## 2019-10-29 ENCOUNTER — RX RENEWAL (OUTPATIENT)
Age: 84
End: 2019-10-29

## 2019-12-16 ENCOUNTER — RX RENEWAL (OUTPATIENT)
Age: 84
End: 2019-12-16

## 2019-12-18 ENCOUNTER — RX RENEWAL (OUTPATIENT)
Age: 84
End: 2019-12-18

## 2020-01-10 ENCOUNTER — RX CHANGE (OUTPATIENT)
Age: 85
End: 2020-01-10

## 2020-01-10 RX ORDER — QUETIAPINE FUMARATE 25 MG/1
25 TABLET ORAL
Qty: 60 | Refills: 11 | Status: DISCONTINUED | COMMUNITY
Start: 2019-12-16 | End: 2020-01-10

## 2020-02-10 ENCOUNTER — APPOINTMENT (OUTPATIENT)
Dept: INTERNAL MEDICINE | Facility: CLINIC | Age: 85
End: 2020-02-10
Payer: MEDICARE

## 2020-02-10 DIAGNOSIS — F03.91 UNSPECIFIED DEMENTIA WITH BEHAVIORAL DISTURBANCE: ICD-10-CM

## 2020-02-10 DIAGNOSIS — E78.5 HYPERLIPIDEMIA, UNSPECIFIED: ICD-10-CM

## 2020-02-10 DIAGNOSIS — Z78.9 OTHER SPECIFIED HEALTH STATUS: ICD-10-CM

## 2020-02-10 DIAGNOSIS — E03.9 HYPOTHYROIDISM, UNSPECIFIED: ICD-10-CM

## 2020-02-10 DIAGNOSIS — R26.81 UNSTEADINESS ON FEET: ICD-10-CM

## 2020-02-10 DIAGNOSIS — B35.1 TINEA UNGUIUM: ICD-10-CM

## 2020-02-10 PROCEDURE — 36415 COLL VENOUS BLD VENIPUNCTURE: CPT

## 2020-02-10 PROCEDURE — 99214 OFFICE O/P EST MOD 30 MIN: CPT | Mod: 25

## 2020-02-10 RX ORDER — QUETIAPINE FUMARATE 25 MG/1
25 TABLET ORAL 3 TIMES DAILY
Qty: 270 | Refills: 3 | Status: ACTIVE | COMMUNITY
Start: 2020-01-10 | End: 1900-01-01

## 2020-02-10 NOTE — ASSESSMENT
[FreeTextEntry1] : 89 with OMS depression and anxiety.  Moved from [facility] to granddaughter home. \par Has 12 hr HHA x 7 days.\par Hypothyroid  Needs TFTs\par New lower denture broke and needs repair.   R lower post in gum appears to be causing granuloma on inside of lower lip and penetrating towards outer skin.  Needs lower plate repaired.  \par Hemorrhoid.  Occas painful.  COnt Prep H.  Suggest MiraLAX\par Agitation.  OK to raise Seroquel to tid and DC Benadryl at HS\par Onychomycosis great toes.  Has Pod at home\par Edema stable\par \par

## 2020-02-10 NOTE — REVIEW OF SYSTEMS
[Incontinence] : incontinence [Negative] : Integumentary [Melena] : no melena [FreeTextEntry2] : see HPI [FreeTextEntry3] : Glasses prn reading [FreeTextEntry9] : Transfers with assistance.  [de-identified] : see HPI

## 2020-02-10 NOTE — HEALTH RISK ASSESSMENT
[Never (0 pts)] : Never (0 points) [No] : In the past 12 months have you used drugs other than those required for medical reasons? No [No falls in past year] : Patient reported no falls in the past year [(PHQ-2) Unable to screen] : PHQ-2: unable to screen [] : No [Audit-CScore] : 0 [UnableToScreenReason] : Dementia

## 2020-02-10 NOTE — PHYSICAL EXAM
[Well Nourished] : well nourished [Well Developed] : well developed [Well-Appearing] : well-appearing [No Carotid Bruits] : no carotid bruits [Pedal Pulses Present] : the pedal pulses are present [Normal] : normal rate, regular rhythm, normal S1 and S2 and no murmur heard [Soft] : abdomen soft [Normal Supraclavicular Nodes] : no supraclavicular lymphadenopathy [Normal Posterior Cervical Nodes] : no posterior cervical lymphadenopathy [Normal Anterior Cervical Nodes] : no anterior cervical lymphadenopathy [No CVA Tenderness] : no CVA  tenderness [Alert and Oriented x3] : oriented to person, place, and time [de-identified] : Cooperative [de-identified] : 1+ edema [de-identified] : Great nails onychomycosis.   R inner lip ulcer and fullness of outer skin below lip  [de-identified] : Verbal and confused.

## 2020-02-10 NOTE — HISTORY OF PRESENT ILLNESS
[FreeTextEntry1] : F/up dementia.  Lower Denture fell and broke.  SO not using set.  Eats puree.   Eats well.   \par Has 2 posts in lower gum and the R side appears to be rubbing on lower lip.  Had sacral DU that has improved.  Has 12 hr HHA x 7 days.  Uses Benadryl lig 12.5-25 mg HS prn.  Seroquel appears effective 1/2 hour after dose.  \par Occas external hemorrhoidal pain and granddaughter and HHA use prep H

## 2020-02-11 LAB
ALBUMIN SERPL ELPH-MCNC: 4.1 G/DL
ALP BLD-CCNC: 47 U/L
ALT SERPL-CCNC: 39 U/L
ANION GAP SERPL CALC-SCNC: 11 MMOL/L
AST SERPL-CCNC: 39 U/L
BASOPHILS # BLD AUTO: 0.03 K/UL
BASOPHILS NFR BLD AUTO: 0.4 %
BILIRUB SERPL-MCNC: 0.4 MG/DL
BUN SERPL-MCNC: 16 MG/DL
CALCIUM SERPL-MCNC: 9.6 MG/DL
CHLORIDE SERPL-SCNC: 100 MMOL/L
CHOLEST SERPL-MCNC: 207 MG/DL
CHOLEST/HDLC SERPL: 4.3 RATIO
CO2 SERPL-SCNC: 28 MMOL/L
CREAT SERPL-MCNC: 0.84 MG/DL
EOSINOPHIL # BLD AUTO: 0.18 K/UL
EOSINOPHIL NFR BLD AUTO: 2.7 %
ESTIMATED AVERAGE GLUCOSE: 117 MG/DL
GLUCOSE SERPL-MCNC: 96 MG/DL
HBA1C MFR BLD HPLC: 5.7 %
HCT VFR BLD CALC: 44 %
HDLC SERPL-MCNC: 48 MG/DL
HGB BLD-MCNC: 13.6 G/DL
IMM GRANULOCYTES NFR BLD AUTO: 0.3 %
LDLC SERPL CALC-MCNC: 127 MG/DL
LYMPHOCYTES # BLD AUTO: 1.17 K/UL
LYMPHOCYTES NFR BLD AUTO: 17.5 %
MAN DIFF?: NORMAL
MCHC RBC-ENTMCNC: 30.9 GM/DL
MCHC RBC-ENTMCNC: 31.9 PG
MCV RBC AUTO: 103.3 FL
MONOCYTES # BLD AUTO: 0.51 K/UL
MONOCYTES NFR BLD AUTO: 7.6 %
NEUTROPHILS # BLD AUTO: 4.77 K/UL
NEUTROPHILS NFR BLD AUTO: 71.5 %
PLATELET # BLD AUTO: 247 K/UL
POTASSIUM SERPL-SCNC: 5 MMOL/L
PROT SERPL-MCNC: 6.8 G/DL
RBC # BLD: 4.26 M/UL
RBC # FLD: 14 %
SODIUM SERPL-SCNC: 139 MMOL/L
T4 SERPL-MCNC: 8.4 UG/DL
TRIGL SERPL-MCNC: 160 MG/DL
TSH SERPL-ACNC: 2.9 UIU/ML
WBC # FLD AUTO: 6.68 K/UL

## 2020-04-11 DIAGNOSIS — R68.89 OTHER GENERAL SYMPTOMS AND SIGNS: ICD-10-CM

## 2020-04-11 RX ORDER — METHYLPREDNISOLONE 4 MG/1
4 TABLET ORAL
Qty: 1 | Refills: 0 | Status: ACTIVE | COMMUNITY
Start: 2020-04-11 | End: 1900-01-01

## 2020-04-12 ENCOUNTER — INPATIENT (INPATIENT)
Facility: HOSPITAL | Age: 85
LOS: 5 days | End: 2020-04-18
Attending: INTERNAL MEDICINE | Admitting: INTERNAL MEDICINE
Payer: MEDICARE

## 2020-04-12 VITALS
SYSTOLIC BLOOD PRESSURE: 130 MMHG | DIASTOLIC BLOOD PRESSURE: 83 MMHG | RESPIRATION RATE: 28 BRPM | OXYGEN SATURATION: 100 % | HEART RATE: 97 BPM

## 2020-04-12 DIAGNOSIS — Z98.890 OTHER SPECIFIED POSTPROCEDURAL STATES: Chronic | ICD-10-CM

## 2020-04-12 LAB
BASE EXCESS BLDV CALC-SCNC: -0.4 MMOL/L — SIGNIFICANT CHANGE UP
BASOPHILS # BLD AUTO: 0.01 K/UL — SIGNIFICANT CHANGE UP (ref 0–0.2)
BASOPHILS NFR BLD AUTO: 0.2 % — SIGNIFICANT CHANGE UP (ref 0–2)
BLOOD GAS VENOUS - CREATININE: 0.88 MG/DL — SIGNIFICANT CHANGE UP (ref 0.5–1.3)
CHLORIDE BLDV-SCNC: 99 MMOL/L — SIGNIFICANT CHANGE UP (ref 96–108)
D DIMER BLD IA.RAPID-MCNC: 858 NG/ML — SIGNIFICANT CHANGE UP
EOSINOPHIL # BLD AUTO: 0 K/UL — SIGNIFICANT CHANGE UP (ref 0–0.5)
EOSINOPHIL NFR BLD AUTO: 0 % — SIGNIFICANT CHANGE UP (ref 0–6)
FIBRINOGEN PPP-MCNC: 650 MG/DL — HIGH (ref 300–520)
GAS PNL BLDV: 133 MMOL/L — LOW (ref 136–146)
GLUCOSE BLDV-MCNC: 306 MG/DL — HIGH (ref 70–99)
HCO3 BLDV-SCNC: 23 MMOL/L — SIGNIFICANT CHANGE UP (ref 20–27)
HCT VFR BLD CALC: 36.2 % — SIGNIFICANT CHANGE UP (ref 34.5–45)
HCT VFR BLDV CALC: 38 % — SIGNIFICANT CHANGE UP (ref 34.5–45)
HGB BLD-MCNC: 11.8 G/DL — SIGNIFICANT CHANGE UP (ref 11.5–15.5)
HGB BLDV-MCNC: 12.4 G/DL — SIGNIFICANT CHANGE UP (ref 11.5–15.5)
IMM GRANULOCYTES NFR BLD AUTO: 0.8 % — SIGNIFICANT CHANGE UP (ref 0–1.5)
LACTATE BLDV-MCNC: 4 MMOL/L — CRITICAL HIGH (ref 0.5–2)
LYMPHOCYTES # BLD AUTO: 0.24 K/UL — LOW (ref 1–3.3)
LYMPHOCYTES # BLD AUTO: 4.6 % — LOW (ref 13–44)
MCHC RBC-ENTMCNC: 32.1 PG — SIGNIFICANT CHANGE UP (ref 27–34)
MCHC RBC-ENTMCNC: 32.6 % — SIGNIFICANT CHANGE UP (ref 32–36)
MCV RBC AUTO: 98.4 FL — SIGNIFICANT CHANGE UP (ref 80–100)
MONOCYTES # BLD AUTO: 0.21 K/UL — SIGNIFICANT CHANGE UP (ref 0–0.9)
MONOCYTES NFR BLD AUTO: 4 % — SIGNIFICANT CHANGE UP (ref 2–14)
NEUTROPHILS # BLD AUTO: 4.76 K/UL — SIGNIFICANT CHANGE UP (ref 1.8–7.4)
NEUTROPHILS NFR BLD AUTO: 90.4 % — HIGH (ref 43–77)
NRBC # FLD: 0 K/UL — SIGNIFICANT CHANGE UP (ref 0–0)
PCO2 BLDV: 48 MMHG — SIGNIFICANT CHANGE UP (ref 41–51)
PH BLDV: 7.33 PH — SIGNIFICANT CHANGE UP (ref 7.32–7.43)
PLATELET # BLD AUTO: 166 K/UL — SIGNIFICANT CHANGE UP (ref 150–400)
PMV BLD: 12.3 FL — SIGNIFICANT CHANGE UP (ref 7–13)
PO2 BLDV: 39 MMHG — SIGNIFICANT CHANGE UP (ref 35–40)
POTASSIUM BLDV-SCNC: 5.4 MMOL/L — HIGH (ref 3.4–4.5)
RBC # BLD: 3.68 M/UL — LOW (ref 3.8–5.2)
RBC # FLD: 13.2 % — SIGNIFICANT CHANGE UP (ref 10.3–14.5)
SAO2 % BLDV: 66.2 % — SIGNIFICANT CHANGE UP (ref 60–85)
WBC # BLD: 5.26 K/UL — SIGNIFICANT CHANGE UP (ref 3.8–10.5)
WBC # FLD AUTO: 5.26 K/UL — SIGNIFICANT CHANGE UP (ref 3.8–10.5)

## 2020-04-12 PROCEDURE — 71045 X-RAY EXAM CHEST 1 VIEW: CPT | Mod: 26

## 2020-04-12 RX ORDER — AZITHROMYCIN 500 MG/1
500 TABLET, FILM COATED ORAL ONCE
Refills: 0 | Status: DISCONTINUED | OUTPATIENT
Start: 2020-04-12 | End: 2020-04-12

## 2020-04-12 RX ORDER — SODIUM CHLORIDE 9 MG/ML
500 INJECTION INTRAMUSCULAR; INTRAVENOUS; SUBCUTANEOUS ONCE
Refills: 0 | Status: COMPLETED | OUTPATIENT
Start: 2020-04-12 | End: 2020-04-12

## 2020-04-12 NOTE — ED PROVIDER NOTE - OBJECTIVE STATEMENT
89 yo F full code with pmhx of hypothyroidism, dementia, HTN comes from home via EMS - due to shortness of breath. As per the granddaughter who is a pediatrician - pt has been coughing, has fever and had low spo2 at home today. At baseline pt has regressed to only speaking Swazi, suffered from loss of memory and is able to have a conversation sometimes. Pt was tested for COVID at home - positive.  Unable to obtain any hx from patient.

## 2020-04-12 NOTE — ED ADULT TRIAGE NOTE - CHIEF COMPLAINT QUOTE
brought in by ems from home for shortness of breath. hx dementia. was 70% on room air as per ems. recently tested positive for covid. pt moaning, not answering questions. unable to obtain temp in triage.      granddaughter/health care proxy #: [phone]

## 2020-04-12 NOTE — ED PROVIDER NOTE - CLINICAL SUMMARY MEDICAL DECISION MAKING FREE TEXT BOX
89 yo F with pmhx of hypothyroidism, dementia, HTN comes from home via EMS - due to shortness of breath. 4L NC. No respiratory distress. Symptoms likely related to COVID. Will repeat testing-no records. Labs, imaging. Admit for oxygen requirement. Will continue to monitor

## 2020-04-12 NOTE — ED PROVIDER NOTE - PROGRESS NOTE DETAILS
Resident Josette: pt was brought on NRB via ambulance - changed to NC 4L Resident Josette El: Discussed the case with Dr. Stiles - agrees with admission.   Continuing to sat at 95% at 4L

## 2020-04-12 NOTE — ED PROVIDER NOTE - CARE PLAN
Principal Discharge DX:	Suspected COVID-19 virus infection  Secondary Diagnosis:	Shortness of breath

## 2020-04-12 NOTE — ED PROVIDER NOTE - ATTENDING CONTRIBUTION TO CARE
89 yo F with pmhx of hypothyroidism, dementia, HTN comes from home via EMS - due to shortness of breath. 4L NC. No respiratory distress. Symptoms likely related to COVID. Will repeat testing-no records. Labs, imaging. Admit for oxygen requirement. Will continue to monitor    OCTAVIANO Domingo: I have personally performed a face to face bedside history and physical examination of this patient. I have discussed the history, examination, review of systems, assessment and plan of management with the resident. I have reviewed the electronic medical record and amended it to reflect my history, review of systems, physical exam, assessment and plan.

## 2020-04-12 NOTE — ED PROVIDER NOTE - PHYSICAL EXAMINATION
Gen:  no respiratory distress  ENT: airway patent, mmm  CV: RRR, +S1/S2, no M/R/G  Resp: mild crackles at bases  GI: abdomen soft non-distended  Extremities - no edema  Neuro: moving all four extremities spontaneously

## 2020-04-13 DIAGNOSIS — J96.91 RESPIRATORY FAILURE, UNSPECIFIED WITH HYPOXIA: ICD-10-CM

## 2020-04-13 DIAGNOSIS — F03.90 UNSPECIFIED DEMENTIA WITHOUT BEHAVIORAL DISTURBANCE: ICD-10-CM

## 2020-04-13 DIAGNOSIS — R68.89 OTHER GENERAL SYMPTOMS AND SIGNS: ICD-10-CM

## 2020-04-13 DIAGNOSIS — U07.1 COVID-19: ICD-10-CM

## 2020-04-13 DIAGNOSIS — Z29.9 ENCOUNTER FOR PROPHYLACTIC MEASURES, UNSPECIFIED: ICD-10-CM

## 2020-04-13 DIAGNOSIS — E03.9 HYPOTHYROIDISM, UNSPECIFIED: ICD-10-CM

## 2020-04-13 LAB
ALBUMIN SERPL ELPH-MCNC: 3.4 G/DL — SIGNIFICANT CHANGE UP (ref 3.3–5)
ALBUMIN SERPL ELPH-MCNC: 3.6 G/DL — SIGNIFICANT CHANGE UP (ref 3.3–5)
ALP SERPL-CCNC: 53 U/L — SIGNIFICANT CHANGE UP (ref 40–120)
ALP SERPL-CCNC: 59 U/L — SIGNIFICANT CHANGE UP (ref 40–120)
ALT FLD-CCNC: 46 U/L — HIGH (ref 4–33)
ALT FLD-CCNC: 53 U/L — HIGH (ref 4–33)
ANION GAP SERPL CALC-SCNC: 16 MMO/L — HIGH (ref 7–14)
ANION GAP SERPL CALC-SCNC: 18 MMO/L — HIGH (ref 7–14)
AST SERPL-CCNC: 60 U/L — HIGH (ref 4–32)
AST SERPL-CCNC: 83 U/L — HIGH (ref 4–32)
BASE EXCESS BLDV CALC-SCNC: -1.2 MMOL/L — SIGNIFICANT CHANGE UP
BASE EXCESS BLDV CALC-SCNC: 2.4 MMOL/L — SIGNIFICANT CHANGE UP
BASOPHILS # BLD AUTO: 0 K/UL — SIGNIFICANT CHANGE UP (ref 0–0.2)
BASOPHILS NFR BLD AUTO: 0 % — SIGNIFICANT CHANGE UP (ref 0–2)
BILIRUB SERPL-MCNC: 0.6 MG/DL — SIGNIFICANT CHANGE UP (ref 0.2–1.2)
BILIRUB SERPL-MCNC: 0.6 MG/DL — SIGNIFICANT CHANGE UP (ref 0.2–1.2)
BLOOD GAS VENOUS - CREATININE: 0.8 MG/DL — SIGNIFICANT CHANGE UP (ref 0.5–1.3)
BLOOD GAS VENOUS - CREATININE: 0.81 MG/DL — SIGNIFICANT CHANGE UP (ref 0.5–1.3)
BLOOD GAS VENOUS - FIO2: 21 — SIGNIFICANT CHANGE UP
BUN SERPL-MCNC: 26 MG/DL — HIGH (ref 7–23)
BUN SERPL-MCNC: 27 MG/DL — HIGH (ref 7–23)
CALCIUM SERPL-MCNC: 9.1 MG/DL — SIGNIFICANT CHANGE UP (ref 8.4–10.5)
CALCIUM SERPL-MCNC: 9.5 MG/DL — SIGNIFICANT CHANGE UP (ref 8.4–10.5)
CHLORIDE BLDV-SCNC: 102 MMOL/L — SIGNIFICANT CHANGE UP (ref 96–108)
CHLORIDE BLDV-SCNC: 103 MMOL/L — SIGNIFICANT CHANGE UP (ref 96–108)
CHLORIDE SERPL-SCNC: 94 MMOL/L — LOW (ref 98–107)
CHLORIDE SERPL-SCNC: 98 MMOL/L — SIGNIFICANT CHANGE UP (ref 98–107)
CO2 SERPL-SCNC: 21 MMOL/L — LOW (ref 22–31)
CO2 SERPL-SCNC: 24 MMOL/L — SIGNIFICANT CHANGE UP (ref 22–31)
CREAT SERPL-MCNC: 0.74 MG/DL — SIGNIFICANT CHANGE UP (ref 0.5–1.3)
CREAT SERPL-MCNC: 0.83 MG/DL — SIGNIFICANT CHANGE UP (ref 0.5–1.3)
CRP SERPL-MCNC: 119.5 MG/L — HIGH
EOSINOPHIL # BLD AUTO: 0 K/UL — SIGNIFICANT CHANGE UP (ref 0–0.5)
EOSINOPHIL NFR BLD AUTO: 0 % — SIGNIFICANT CHANGE UP (ref 0–6)
FERRITIN SERPL-MCNC: 1782 NG/ML — HIGH (ref 15–150)
GAS PNL BLDV: 134 MMOL/L — LOW (ref 136–146)
GAS PNL BLDV: 137 MMOL/L — SIGNIFICANT CHANGE UP (ref 136–146)
GLUCOSE BLDC GLUCOMTR-MCNC: 128 MG/DL — HIGH (ref 70–99)
GLUCOSE BLDC GLUCOMTR-MCNC: 157 MG/DL — HIGH (ref 70–99)
GLUCOSE BLDV-MCNC: 175 MG/DL — HIGH (ref 70–99)
GLUCOSE BLDV-MCNC: 246 MG/DL — HIGH (ref 70–99)
GLUCOSE SERPL-MCNC: 181 MG/DL — HIGH (ref 70–99)
GLUCOSE SERPL-MCNC: 316 MG/DL — HIGH (ref 70–99)
HBA1C BLD-MCNC: 5.9 % — HIGH (ref 4–5.6)
HCO3 BLDV-SCNC: 23 MMOL/L — SIGNIFICANT CHANGE UP (ref 20–27)
HCO3 BLDV-SCNC: 25 MMOL/L — SIGNIFICANT CHANGE UP (ref 20–27)
HCT VFR BLD CALC: 38.6 % — SIGNIFICANT CHANGE UP (ref 34.5–45)
HCT VFR BLDV CALC: 35.5 % — SIGNIFICANT CHANGE UP (ref 34.5–45)
HCT VFR BLDV CALC: 40.1 % — SIGNIFICANT CHANGE UP (ref 34.5–45)
HGB BLD-MCNC: 12.5 G/DL — SIGNIFICANT CHANGE UP (ref 11.5–15.5)
HGB BLDV-MCNC: 11.5 G/DL — SIGNIFICANT CHANGE UP (ref 11.5–15.5)
HGB BLDV-MCNC: 13 G/DL — SIGNIFICANT CHANGE UP (ref 11.5–15.5)
IMM GRANULOCYTES NFR BLD AUTO: 0.7 % — SIGNIFICANT CHANGE UP (ref 0–1.5)
LACTATE BLDV-MCNC: 3 MMOL/L — HIGH (ref 0.5–2)
LACTATE BLDV-MCNC: 3.1 MMOL/L — HIGH (ref 0.5–2)
LDH SERPL L TO P-CCNC: 490 U/L — HIGH (ref 135–225)
LYMPHOCYTES # BLD AUTO: 0.45 K/UL — LOW (ref 1–3.3)
LYMPHOCYTES # BLD AUTO: 8.1 % — LOW (ref 13–44)
MAGNESIUM SERPL-MCNC: 2.2 MG/DL — SIGNIFICANT CHANGE UP (ref 1.6–2.6)
MCHC RBC-ENTMCNC: 31.7 PG — SIGNIFICANT CHANGE UP (ref 27–34)
MCHC RBC-ENTMCNC: 32.4 % — SIGNIFICANT CHANGE UP (ref 32–36)
MCV RBC AUTO: 98 FL — SIGNIFICANT CHANGE UP (ref 80–100)
MONOCYTES # BLD AUTO: 0.27 K/UL — SIGNIFICANT CHANGE UP (ref 0–0.9)
MONOCYTES NFR BLD AUTO: 4.8 % — SIGNIFICANT CHANGE UP (ref 2–14)
NEUTROPHILS # BLD AUTO: 4.83 K/UL — SIGNIFICANT CHANGE UP (ref 1.8–7.4)
NEUTROPHILS NFR BLD AUTO: 86.4 % — HIGH (ref 43–77)
NRBC # FLD: 0 K/UL — SIGNIFICANT CHANGE UP (ref 0–0)
NT-PROBNP SERPL-SCNC: SIGNIFICANT CHANGE UP PG/ML
PCO2 BLDV: 45 MMHG — SIGNIFICANT CHANGE UP (ref 41–51)
PCO2 BLDV: 47 MMHG — SIGNIFICANT CHANGE UP (ref 41–51)
PH BLDV: 7.34 PH — SIGNIFICANT CHANGE UP (ref 7.32–7.43)
PH BLDV: 7.38 PH — SIGNIFICANT CHANGE UP (ref 7.32–7.43)
PHOSPHATE SERPL-MCNC: 3.4 MG/DL — SIGNIFICANT CHANGE UP (ref 2.5–4.5)
PLATELET # BLD AUTO: 215 K/UL — SIGNIFICANT CHANGE UP (ref 150–400)
PMV BLD: 12.5 FL — SIGNIFICANT CHANGE UP (ref 7–13)
PO2 BLDV: 53 MMHG — HIGH (ref 35–40)
PO2 BLDV: < 24 MMHG — LOW (ref 35–40)
POTASSIUM BLDV-SCNC: 4 MMOL/L — SIGNIFICANT CHANGE UP (ref 3.4–4.5)
POTASSIUM BLDV-SCNC: 4.5 MMOL/L — SIGNIFICANT CHANGE UP (ref 3.4–4.5)
POTASSIUM SERPL-MCNC: 4.6 MMOL/L — SIGNIFICANT CHANGE UP (ref 3.5–5.3)
POTASSIUM SERPL-MCNC: 4.7 MMOL/L — SIGNIFICANT CHANGE UP (ref 3.5–5.3)
POTASSIUM SERPL-SCNC: 4.6 MMOL/L — SIGNIFICANT CHANGE UP (ref 3.5–5.3)
POTASSIUM SERPL-SCNC: 4.7 MMOL/L — SIGNIFICANT CHANGE UP (ref 3.5–5.3)
PROCALCITONIN SERPL-MCNC: 0.17 NG/ML — HIGH (ref 0.02–0.1)
PROT SERPL-MCNC: 6.8 G/DL — SIGNIFICANT CHANGE UP (ref 6–8.3)
PROT SERPL-MCNC: 6.9 G/DL — SIGNIFICANT CHANGE UP (ref 6–8.3)
RBC # BLD: 3.94 M/UL — SIGNIFICANT CHANGE UP (ref 3.8–5.2)
RBC # FLD: 13.2 % — SIGNIFICANT CHANGE UP (ref 10.3–14.5)
SAO2 % BLDV: 31.2 % — LOW (ref 60–85)
SAO2 % BLDV: 79.6 % — SIGNIFICANT CHANGE UP (ref 60–85)
SARS-COV-2 RNA SPEC QL NAA+PROBE: DETECTED
SODIUM SERPL-SCNC: 133 MMOL/L — LOW (ref 135–145)
SODIUM SERPL-SCNC: 138 MMOL/L — SIGNIFICANT CHANGE UP (ref 135–145)
WBC # BLD: 5.59 K/UL — SIGNIFICANT CHANGE UP (ref 3.8–10.5)
WBC # FLD AUTO: 5.59 K/UL — SIGNIFICANT CHANGE UP (ref 3.8–10.5)

## 2020-04-13 PROCEDURE — 73521 X-RAY EXAM HIPS BI 2 VIEWS: CPT | Mod: 26

## 2020-04-13 PROCEDURE — 93010 ELECTROCARDIOGRAM REPORT: CPT

## 2020-04-13 PROCEDURE — 12345: CPT | Mod: NC

## 2020-04-13 RX ORDER — SODIUM CHLORIDE 9 MG/ML
1000 INJECTION, SOLUTION INTRAVENOUS
Refills: 0 | Status: DISCONTINUED | OUTPATIENT
Start: 2020-04-13 | End: 2020-04-18

## 2020-04-13 RX ORDER — CHOLECALCIFEROL (VITAMIN D3) 125 MCG
1000 CAPSULE ORAL DAILY
Refills: 0 | Status: DISCONTINUED | OUTPATIENT
Start: 2020-04-13 | End: 2020-04-18

## 2020-04-13 RX ORDER — DEXTROSE 50 % IN WATER 50 %
12.5 SYRINGE (ML) INTRAVENOUS ONCE
Refills: 0 | Status: DISCONTINUED | OUTPATIENT
Start: 2020-04-13 | End: 2020-04-18

## 2020-04-13 RX ORDER — ENOXAPARIN SODIUM 100 MG/ML
40 INJECTION SUBCUTANEOUS DAILY
Refills: 0 | Status: DISCONTINUED | OUTPATIENT
Start: 2020-04-13 | End: 2020-04-14

## 2020-04-13 RX ORDER — QUETIAPINE FUMARATE 200 MG/1
0 TABLET, FILM COATED ORAL
Qty: 0 | Refills: 0 | DISCHARGE

## 2020-04-13 RX ORDER — ENOXAPARIN SODIUM 100 MG/ML
30 INJECTION SUBCUTANEOUS DAILY
Refills: 0 | Status: DISCONTINUED | OUTPATIENT
Start: 2020-04-13 | End: 2020-04-13

## 2020-04-13 RX ORDER — DEXTROSE 50 % IN WATER 50 %
25 SYRINGE (ML) INTRAVENOUS ONCE
Refills: 0 | Status: DISCONTINUED | OUTPATIENT
Start: 2020-04-13 | End: 2020-04-18

## 2020-04-13 RX ORDER — DONEPEZIL HYDROCHLORIDE 10 MG/1
10 TABLET, FILM COATED ORAL AT BEDTIME
Refills: 0 | Status: DISCONTINUED | OUTPATIENT
Start: 2020-04-13 | End: 2020-04-18

## 2020-04-13 RX ORDER — PREGABALIN 225 MG/1
1000 CAPSULE ORAL DAILY
Refills: 0 | Status: DISCONTINUED | OUTPATIENT
Start: 2020-04-13 | End: 2020-04-18

## 2020-04-13 RX ORDER — CALCIUM CARBONATE 500(1250)
600 TABLET ORAL EVERY 12 HOURS
Refills: 0 | Status: DISCONTINUED | OUTPATIENT
Start: 2020-04-13 | End: 2020-04-18

## 2020-04-13 RX ORDER — INSULIN LISPRO 100/ML
VIAL (ML) SUBCUTANEOUS
Refills: 0 | Status: DISCONTINUED | OUTPATIENT
Start: 2020-04-13 | End: 2020-04-18

## 2020-04-13 RX ORDER — ACETAMINOPHEN 500 MG
1000 TABLET ORAL ONCE
Refills: 0 | Status: COMPLETED | OUTPATIENT
Start: 2020-04-13 | End: 2020-04-13

## 2020-04-13 RX ORDER — LEVOTHYROXINE SODIUM 125 MCG
1 TABLET ORAL
Qty: 0 | Refills: 0 | DISCHARGE

## 2020-04-13 RX ORDER — QUETIAPINE FUMARATE 200 MG/1
25 TABLET, FILM COATED ORAL
Refills: 0 | Status: DISCONTINUED | OUTPATIENT
Start: 2020-04-13 | End: 2020-04-18

## 2020-04-13 RX ORDER — AZITHROMYCIN 500 MG/1
500 TABLET, FILM COATED ORAL ONCE
Refills: 0 | Status: DISCONTINUED | OUTPATIENT
Start: 2020-04-13 | End: 2020-04-13

## 2020-04-13 RX ORDER — DEXTROSE 50 % IN WATER 50 %
15 SYRINGE (ML) INTRAVENOUS ONCE
Refills: 0 | Status: DISCONTINUED | OUTPATIENT
Start: 2020-04-13 | End: 2020-04-18

## 2020-04-13 RX ORDER — LEVOTHYROXINE SODIUM 125 MCG
125 TABLET ORAL DAILY
Refills: 0 | Status: DISCONTINUED | OUTPATIENT
Start: 2020-04-13 | End: 2020-04-18

## 2020-04-13 RX ORDER — GLUCAGON INJECTION, SOLUTION 0.5 MG/.1ML
1 INJECTION, SOLUTION SUBCUTANEOUS ONCE
Refills: 0 | Status: DISCONTINUED | OUTPATIENT
Start: 2020-04-13 | End: 2020-04-18

## 2020-04-13 RX ADMIN — Medication 40 MILLIGRAM(S): at 00:02

## 2020-04-13 RX ADMIN — DONEPEZIL HYDROCHLORIDE 10 MILLIGRAM(S): 10 TABLET, FILM COATED ORAL at 21:51

## 2020-04-13 RX ADMIN — ENOXAPARIN SODIUM 40 MILLIGRAM(S): 100 INJECTION SUBCUTANEOUS at 11:21

## 2020-04-13 RX ADMIN — SODIUM CHLORIDE 500 MILLILITER(S): 9 INJECTION INTRAMUSCULAR; INTRAVENOUS; SUBCUTANEOUS at 00:02

## 2020-04-13 RX ADMIN — Medication 1000 UNIT(S): at 11:22

## 2020-04-13 RX ADMIN — Medication 125 MICROGRAM(S): at 11:21

## 2020-04-13 RX ADMIN — Medication 1: at 18:28

## 2020-04-13 RX ADMIN — Medication 400 MILLIGRAM(S): at 18:33

## 2020-04-13 RX ADMIN — Medication 2: at 09:14

## 2020-04-13 RX ADMIN — Medication 600 MILLIGRAM(S): at 21:51

## 2020-04-13 RX ADMIN — PREGABALIN 1000 MICROGRAM(S): 225 CAPSULE ORAL at 11:21

## 2020-04-13 RX ADMIN — QUETIAPINE FUMARATE 25 MILLIGRAM(S): 200 TABLET, FILM COATED ORAL at 18:28

## 2020-04-13 RX ADMIN — Medication 1: at 12:40

## 2020-04-13 NOTE — PROGRESS NOTE ADULT - PROBLEM SELECTOR PLAN 2
- pt tested positive outpatient  - no indication for steroids or anakinra   - qtc on last EKG shows qtc 497 however 2 other EKGs show Qtc 415? pt also on quetiapine; would hold off on starting Plaquenil.  F/U AM EKG - pt tested positive outpatient  - no indication for steroids or anakinra   - qtc on last EKG shows qtc 497 however 2 other EKGs show Qtc 415? pt also on quetiapine; would hold off on starting Plaquenil.  F/U repeat EKG - pt tested positive outpatient  - no indication for steroids or anakinra   - qtc on last EKG  468 , will repeat in am

## 2020-04-13 NOTE — PROGRESS NOTE ADULT - SUBJECTIVE AND OBJECTIVE BOX
Patient is a 89y old  Female who presents with a chief complaint of Hypoxic Respiratory Failure (13 Apr 2020 03:38)      SUBJECTIVE / OVERNIGHT EVENTS:    MEDICATIONS  (STANDING):  calcium carbonate   Suspension 600 milliGRAM(s) Oral every 12 hours  cholecalciferol 1000 Unit(s) Oral daily  cyanocobalamin 1000 MICROGram(s) Oral daily  dextrose 5%. 1000 milliLiter(s) (50 mL/Hr) IV Continuous <Continuous>  dextrose 50% Injectable 12.5 Gram(s) IV Push once  dextrose 50% Injectable 25 Gram(s) IV Push once  dextrose 50% Injectable 25 Gram(s) IV Push once  donepezil 10 milliGRAM(s) Oral at bedtime  enoxaparin Injectable 40 milliGRAM(s) SubCutaneous daily  insulin lispro (HumaLOG) corrective regimen sliding scale   SubCutaneous three times a day before meals  levothyroxine 125 MICROGram(s) Oral daily    MEDICATIONS  (PRN):  dextrose 40% Gel 15 Gram(s) Oral once PRN Blood Glucose LESS THAN 70 milliGRAM(s)/deciliter  glucagon  Injectable 1 milliGRAM(s) IntraMuscular once PRN Glucose LESS THAN 70 milligrams/deciliter      Vital Signs Last 24 Hrs  T(C): 36.3 (13 Apr 2020 01:50), Max: 37.8 (12 Apr 2020 23:33)  T(F): 97.4 (13 Apr 2020 01:50), Max: 100 (12 Apr 2020 23:33)  HR: 72 (13 Apr 2020 01:50) (72 - 97)  BP: 97/62 (13 Apr 2020 01:50) (97/62 - 130/83)  BP(mean): --  RR: 24 (13 Apr 2020 01:50) (24 - 28)  SpO2: 96% (13 Apr 2020 01:50) (96% - 100%)  CAPILLARY BLOOD GLUCOSE      POCT Blood Glucose.: 212 mg/dL (13 Apr 2020 08:22)    I&O's Summary      PHYSICAL EXAM:  GENERAL: NAD, well-developed  HEAD:  Atraumatic, Normocephalic  EYES: EOMI, PERRLA, conjunctiva and sclera clear  NECK: Supple, No JVD  CHEST/LUNG: Clear to auscultation bilaterally; No wheeze  HEART: Regular rate and rhythm; No murmurs, rubs, or gallops  ABDOMEN: Soft, Nontender, Nondistended; Bowel sounds present  EXTREMITIES:  2+ Peripheral Pulses, No clubbing, cyanosis, or edema  PSYCH: AAOx3  NEUROLOGY: non-focal  SKIN: No rashes or lesions    LABS:                        11.8   5.26  )-----------( 166      ( 12 Apr 2020 23:10 )             36.2     04-12    133<L>  |  94<L>  |  27<H>  ----------------------------<  316<H>  4.7   |  21<L>  |  0.83    Ca    9.1      12 Apr 2020 23:10    TPro  6.8  /  Alb  3.4  /  TBili  0.6  /  DBili  x   /  AST  83<H>  /  ALT  53<H>  /  AlkPhos  59  04-12              RADIOLOGY & ADDITIONAL TESTS:    Imaging Personally Reviewed:    Consultant(s) Notes Reviewed:      Care Discussed with Consultants/Other Providers: Patient is a 89y old  Female who presents with a chief complaint of Hypoxic Respiratory Failure (13 Apr 2020 03:38)      SUBJECTIVE / OVERNIGHT EVENTS: patient seen and examined by bedside, on NC saturating 96%, no acute distress noted        MEDICATIONS  (STANDING):  calcium carbonate   Suspension 600 milliGRAM(s) Oral every 12 hours  cholecalciferol 1000 Unit(s) Oral daily  cyanocobalamin 1000 MICROGram(s) Oral daily  dextrose 5%. 1000 milliLiter(s) (50 mL/Hr) IV Continuous <Continuous>  dextrose 50% Injectable 12.5 Gram(s) IV Push once  dextrose 50% Injectable 25 Gram(s) IV Push once  dextrose 50% Injectable 25 Gram(s) IV Push once  donepezil 10 milliGRAM(s) Oral at bedtime  enoxaparin Injectable 40 milliGRAM(s) SubCutaneous daily  insulin lispro (HumaLOG) corrective regimen sliding scale   SubCutaneous three times a day before meals  levothyroxine 125 MICROGram(s) Oral daily    MEDICATIONS  (PRN):  dextrose 40% Gel 15 Gram(s) Oral once PRN Blood Glucose LESS THAN 70 milliGRAM(s)/deciliter  glucagon  Injectable 1 milliGRAM(s) IntraMuscular once PRN Glucose LESS THAN 70 milligrams/deciliter      Vital Signs Last 24 Hrs  T(C): 36.3 (13 Apr 2020 01:50), Max: 37.8 (12 Apr 2020 23:33)  T(F): 97.4 (13 Apr 2020 01:50), Max: 100 (12 Apr 2020 23:33)  HR: 72 (13 Apr 2020 01:50) (72 - 97)  BP: 97/62 (13 Apr 2020 01:50) (97/62 - 130/83)  BP(mean): --  RR: 24 (13 Apr 2020 01:50) (24 - 28)  SpO2: 96% (13 Apr 2020 01:50) (96% - 100%)  CAPILLARY BLOOD GLUCOSE      POCT Blood Glucose.: 212 mg/dL (13 Apr 2020 08:22)    I&O's Summary      PHYSICAL EXAM:  GENERAL: NAD  CHEST/LUNG: mild crackles at bases  HEART: Regular rate and rhythm;   ABDOMEN: Soft, Nontender, Nondistended  EXTREMITIES: No clubbing, cyanosis, or edema  PSYCH: calm   NEUROLOGY: awake, moving all four extremities spontaneously      LABS:                        11.8   5.26  )-----------( 166      ( 12 Apr 2020 23:10 )             36.2     04-12    133<L>  |  94<L>  |  27<H>  ----------------------------<  316<H>  4.7   |  21<L>  |  0.83    Ca    9.1      12 Apr 2020 23:10    TPro  6.8  /  Alb  3.4  /  TBili  0.6  /  DBili  x   /  AST  83<H>  /  ALT  53<H>  /  AlkPhos  59  04-12              RADIOLOGY & ADDITIONAL TESTS:    Imaging Personally Reviewed:    Consultant(s) Notes Reviewed:      Care Discussed with Consultants/Other Providers: Patient is a 89y old  Female who presents with a chief complaint of Hypoxic Respiratory Failure (13 Apr 2020 03:38)      SUBJECTIVE / OVERNIGHT EVENTS: patient seen and examined by bedside, on NC saturating 96%, no acute distress noted        MEDICATIONS  (STANDING):  calcium carbonate   Suspension 600 milliGRAM(s) Oral every 12 hours  cholecalciferol 1000 Unit(s) Oral daily  cyanocobalamin 1000 MICROGram(s) Oral daily  dextrose 5%. 1000 milliLiter(s) (50 mL/Hr) IV Continuous <Continuous>  dextrose 50% Injectable 12.5 Gram(s) IV Push once  dextrose 50% Injectable 25 Gram(s) IV Push once  dextrose 50% Injectable 25 Gram(s) IV Push once  donepezil 10 milliGRAM(s) Oral at bedtime  enoxaparin Injectable 40 milliGRAM(s) SubCutaneous daily  insulin lispro (HumaLOG) corrective regimen sliding scale   SubCutaneous three times a day before meals  levothyroxine 125 MICROGram(s) Oral daily    MEDICATIONS  (PRN):  dextrose 40% Gel 15 Gram(s) Oral once PRN Blood Glucose LESS THAN 70 milliGRAM(s)/deciliter  glucagon  Injectable 1 milliGRAM(s) IntraMuscular once PRN Glucose LESS THAN 70 milligrams/deciliter      Vital Signs Last 24 Hrs  T(C): 36.3 (13 Apr 2020 01:50), Max: 37.8 (12 Apr 2020 23:33)  T(F): 97.4 (13 Apr 2020 01:50), Max: 100 (12 Apr 2020 23:33)  HR: 72 (13 Apr 2020 01:50) (72 - 97)  BP: 97/62 (13 Apr 2020 01:50) (97/62 - 130/83)  BP(mean): --  RR: 24 (13 Apr 2020 01:50) (24 - 28)  SpO2: 96% (13 Apr 2020 01:50) (96% - 100%)  CAPILLARY BLOOD GLUCOSE      POCT Blood Glucose.: 212 mg/dL (13 Apr 2020 08:22)    I&O's Summary      PHYSICAL EXAM:  GENERAL: NAD  HEENT; mild ecchymosis on the rt cheek   CHEST/LUNG: mild crackles at bases  HEART: Regular rate and rhythm;   ABDOMEN: Soft, Nontender, Nondistended  EXTREMITIES: No clubbing, cyanosis, or edema  PSYCH: calm   NEUROLOGY: awake, moving all four extremities spontaneously      LABS:                        11.8   5.26  )-----------( 166      ( 12 Apr 2020 23:10 )             36.2     04-12    133<L>  |  94<L>  |  27<H>  ----------------------------<  316<H>  4.7   |  21<L>  |  0.83    Ca    9.1      12 Apr 2020 23:10    TPro  6.8  /  Alb  3.4  /  TBili  0.6  /  DBili  x   /  AST  83<H>  /  ALT  53<H>  /  AlkPhos  59  04-12              RADIOLOGY & ADDITIONAL TESTS:    Imaging Personally Reviewed:    Consultant(s) Notes Reviewed:      Care Discussed with Consultants/Other Providers:

## 2020-04-13 NOTE — H&P ADULT - NSHPPHYSICALEXAM_GEN_ALL_CORE
Vital Signs Last 24 Hrs  T(C): 36.3 (13 Apr 2020 01:50), Max: 37.8 (12 Apr 2020 23:33)  T(F): 97.4 (13 Apr 2020 01:50), Max: 100 (12 Apr 2020 23:33)  HR: 72 (13 Apr 2020 01:50) (72 - 97)  BP: 97/62 (13 Apr 2020 01:50) (97/62 - 130/83)  BP(mean): --  RR: 24 (13 Apr 2020 01:50) (24 - 28)  SpO2: 96% (13 Apr 2020 01:50) (96% - 100%)    Gen:  no respiratory distress  ENT: airway patent, mmm  CV: RRR, +S1/S2, no M/R/G  Resp: mild crackles at bases  GI: abdomen soft non-distended  Extremities - no edema  Neuro: moving all four extremities spontaneously

## 2020-04-13 NOTE — H&P ADULT - HISTORY OF PRESENT ILLNESS
brought in by ems from home for shortness of breath. hx dementia. was 70% on room air as per ems. recently tested positive for covid. pt moaning, not answering questions. unable to obtain temp in triage.      granddaughter/health care proxy #: [phone]    130 mm Hg	  83 mm Hg	  97 /min	    28 /min	  100 %	  mask, nonrebreather	  10 L/min PER ED NOTE:  89 yo F full code with pmhx of hypothyroidism, dementia, HTN comes from home via EMS due to shortness of breath. As per the granddaughter who is a pediatrician - pt has been coughing, has fever and had low spo2 at home today. At baseline pt has regressed to only speaking Ghanaian, suffered from loss of memory and is able to have a conversation sometimes. Pt was tested for COVID at home - positive.  Unable to obtain any hx from patient    Upon arrival to the ED triage pt. was 70% on room air as per ems and was moaning, not answering questions    ED triage vitals:  130 mm Hg	  83 mm Hg	  97 /min	    28 /min	  100 %	  mask, nonrebreather	  10 L/min	    Pt was given a 500 cc NS bolus and 1x dose of 40 IV Solu-medrol in the ED

## 2020-04-13 NOTE — H&P ADULT - PROBLEM SELECTOR PLAN 5
- DVT ppx: lovenox 30  - Diet: pt needs bedside swallow; will start dysphagia 3 w/ honey consistency liquid  - Code status: per HCP pt full code for now; would readdress in AM    Ifrah Bullock MD  Internal Medicine, PGY-2   [phone]

## 2020-04-13 NOTE — PROGRESS NOTE ADULT - PROBLEM SELECTOR PLAN 1
- c/w supplemental O2 to maintain SpO2 > 92%  - per granddaughter pt had never received Plaquenil   - not on NRB - will hold off on steroids   - albuterol inhaler PRN - c/w supplemental O2 to maintain SpO2 > 92%  - per granddaughter pt had never received Plaquenil   - not on NRB - will hold off on steroids   - albuterol inhaler PRN  - will trend inflammatory markers and lactate  -mild hyponatremia and transaminitis secondary to COVID infection, will continue to monitor

## 2020-04-13 NOTE — H&P ADULT - NSHPLABSRESULTS_GEN_ALL_CORE
LABS:                        11.8   5.26  )-----------( 166      ( 12 Apr 2020 23:10 )             36.2     04-12    133<L>  |  94<L>  |  27<H>  ----------------------------<  316<H>  4.7   |  21<L>  |  0.83    Ca    9.1      12 Apr 2020 23:10    TPro  6.8  /  Alb  3.4  /  TBili  0.6  /  DBili  x   /  AST  83<H>  /  ALT  53<H>  /  AlkPhos  59  04-12      Procalcitonin, Serum: 0.17 (04-12)  C-Reactive Protein, Serum: 119.5 (04-12)  D-Dimer Assay, Quantitative: 858 (04-12)    < from: Xray Chest 1 View-PORTABLE IMMEDIATE (04.12.20 @ 23:05) >    INTERPRETATION:  Bilateral hazy opacities likely representing multifocal infection.    < end of copied text >      RADIOLOGY & ADDITIONAL TESTS:  Results Reviewed: yes   Imaging Personally Reviewed: yes   Electrocardiogram Personally Reviewed:    COORDINATION OF CARE:  Care Discussed with Consultants/Other Providers [Y/N]:  Prior or Outpatient Records Reviewed [Y/N]:

## 2020-04-13 NOTE — PROGRESS NOTE ADULT - ATTENDING COMMENTS
case d/w granddaughter, updates give,   Advance care directives discussed, wants her to be full code at this time, explained to her  if it comes to that point she will probably not be a candidate for intubation based on her age and comorbidities

## 2020-04-13 NOTE — H&P ADULT - PROBLEM SELECTOR PLAN 2
- pt tested positive outpatient  - no indication for steroids or anakinra   - qtc on last EKG shows qtc 497 however 2 other EKGs show Qtc 415? pt also on quetiapine; would hold off on starting Plaquenil.  F/U AM EKG

## 2020-04-13 NOTE — H&P ADULT - PROBLEM SELECTOR PLAN 1
- c/w supplemental O2 to maintain SpO2 > 92%  - per granddaughter pt had never received Plaquenil   - not on NRB - will hold off on steroids   - albuterol inhaler PRN

## 2020-04-13 NOTE — CHART NOTE - NSCHARTNOTEFT_GEN_A_CORE
As per pt's granddaughter pt has been on Talopram 20 mg QAM, Seroqul 25 mg AQM and 50 mg Q6PM. GN also said that pt fell from  facedown, resulted in facial bruise prior to coming to ED, no LOC. Will resume Seroquel at lower dose 25 mg BID and continue to monitor mental status    Ashli Heredia ACPNP  [id number]

## 2020-04-13 NOTE — ED ADULT NURSE REASSESSMENT NOTE - NS ED NURSE REASSESS COMMENT FT1
Pt received, BIBEMS with c/o SOB. As per the granddaughter, pt has been coughing, has fever and had low SpO2 at home today. Pt arrived via nonrebreather, downgraded to NC in the ED with saturation at 97% noted. Respiration even and unlabored. Patient A&Ox1, no teeth, no dentures noted on arrival. Labs sent. 20g angiocath placed on R AC. will monitor

## 2020-04-13 NOTE — H&P ADULT - ASSESSMENT
89 yo F full code with pmhx of hypothyroidism, dementia, HTN admitted for respiratory failure likely 2/2 COVID

## 2020-04-13 NOTE — PROGRESS NOTE ADULT - PROBLEM SELECTOR PLAN 5
- DVT ppx: lovenox 30  - Diet: pt needs bedside swallow; will start dysphagia 3 w/ honey consistency liquid  - Code status: per HCP pt full code for now; would readdress in AM    Ifrah Bullock MD  Internal Medicine, PGY-2   [phone] - DVT ppx: lovenox 30  - Diet: pt needs bedside swallow; will start dysphagia 3 w/ honey consistency liquid  - Code status: per HCP pt full code for now;

## 2020-04-14 DIAGNOSIS — I48.91 UNSPECIFIED ATRIAL FIBRILLATION: ICD-10-CM

## 2020-04-14 LAB
ANION GAP SERPL CALC-SCNC: 19 MMO/L — HIGH (ref 7–14)
BUN SERPL-MCNC: 34 MG/DL — HIGH (ref 7–23)
CALCIUM SERPL-MCNC: 9.7 MG/DL — SIGNIFICANT CHANGE UP (ref 8.4–10.5)
CHLORIDE SERPL-SCNC: 99 MMOL/L — SIGNIFICANT CHANGE UP (ref 98–107)
CO2 SERPL-SCNC: 20 MMOL/L — LOW (ref 22–31)
CREAT SERPL-MCNC: 0.81 MG/DL — SIGNIFICANT CHANGE UP (ref 0.5–1.3)
CRP SERPL-MCNC: 71.4 MG/L — HIGH
FERRITIN SERPL-MCNC: 2699 NG/ML — HIGH (ref 15–150)
GLUCOSE BLDC GLUCOMTR-MCNC: 111 MG/DL — HIGH (ref 70–99)
GLUCOSE BLDC GLUCOMTR-MCNC: 154 MG/DL — HIGH (ref 70–99)
GLUCOSE BLDC GLUCOMTR-MCNC: 184 MG/DL — HIGH (ref 70–99)
GLUCOSE BLDC GLUCOMTR-MCNC: 50 MG/DL — LOW (ref 70–99)
GLUCOSE BLDC GLUCOMTR-MCNC: 58 MG/DL — LOW (ref 70–99)
GLUCOSE BLDC GLUCOMTR-MCNC: 99 MG/DL — SIGNIFICANT CHANGE UP (ref 70–99)
GLUCOSE SERPL-MCNC: 127 MG/DL — HIGH (ref 70–99)
HBA1C BLD-MCNC: 6.1 % — HIGH (ref 4–5.6)
LDH SERPL L TO P-CCNC: 590 U/L — HIGH (ref 135–225)
MAGNESIUM SERPL-MCNC: 2.3 MG/DL — SIGNIFICANT CHANGE UP (ref 1.6–2.6)
PHOSPHATE SERPL-MCNC: 4.1 MG/DL — SIGNIFICANT CHANGE UP (ref 2.5–4.5)
POTASSIUM SERPL-MCNC: 4.8 MMOL/L — SIGNIFICANT CHANGE UP (ref 3.5–5.3)
POTASSIUM SERPL-SCNC: 4.8 MMOL/L — SIGNIFICANT CHANGE UP (ref 3.5–5.3)
SODIUM SERPL-SCNC: 138 MMOL/L — SIGNIFICANT CHANGE UP (ref 135–145)
VIT B12 SERPL-MCNC: > 2000 PG/ML — HIGH (ref 200–900)

## 2020-04-14 PROCEDURE — 99233 SBSQ HOSP IP/OBS HIGH 50: CPT | Mod: GC

## 2020-04-14 PROCEDURE — 93010 ELECTROCARDIOGRAM REPORT: CPT

## 2020-04-14 RX ORDER — HYDROXYCHLOROQUINE SULFATE 200 MG
TABLET ORAL
Refills: 0 | Status: DISCONTINUED | OUTPATIENT
Start: 2020-04-14 | End: 2020-04-16

## 2020-04-14 RX ORDER — ENOXAPARIN SODIUM 100 MG/ML
65 INJECTION SUBCUTANEOUS
Refills: 0 | Status: DISCONTINUED | OUTPATIENT
Start: 2020-04-15 | End: 2020-04-18

## 2020-04-14 RX ORDER — DEXTROSE 50 % IN WATER 50 %
12.5 SYRINGE (ML) INTRAVENOUS ONCE
Refills: 0 | Status: COMPLETED | OUTPATIENT
Start: 2020-04-14 | End: 2020-04-14

## 2020-04-14 RX ORDER — HYDROXYCHLOROQUINE SULFATE 200 MG
TABLET ORAL
Refills: 0 | Status: DISCONTINUED | OUTPATIENT
Start: 2020-04-14 | End: 2020-04-14

## 2020-04-14 RX ORDER — SODIUM CHLORIDE 9 MG/ML
1000 INJECTION, SOLUTION INTRAVENOUS
Refills: 0 | Status: DISCONTINUED | OUTPATIENT
Start: 2020-04-14 | End: 2020-04-18

## 2020-04-14 RX ORDER — HYDROXYCHLOROQUINE SULFATE 200 MG
400 TABLET ORAL EVERY 12 HOURS
Refills: 0 | Status: COMPLETED | OUTPATIENT
Start: 2020-04-14 | End: 2020-04-15

## 2020-04-14 RX ORDER — HYDROXYCHLOROQUINE SULFATE 200 MG
200 TABLET ORAL EVERY 12 HOURS
Refills: 0 | Status: DISCONTINUED | OUTPATIENT
Start: 2020-04-15 | End: 2020-04-16

## 2020-04-14 RX ORDER — ENOXAPARIN SODIUM 100 MG/ML
20 INJECTION SUBCUTANEOUS ONCE
Refills: 0 | Status: COMPLETED | OUTPATIENT
Start: 2020-04-14 | End: 2020-04-14

## 2020-04-14 RX ADMIN — QUETIAPINE FUMARATE 25 MILLIGRAM(S): 200 TABLET, FILM COATED ORAL at 18:17

## 2020-04-14 RX ADMIN — Medication 600 MILLIGRAM(S): at 18:25

## 2020-04-14 RX ADMIN — PREGABALIN 1000 MICROGRAM(S): 225 CAPSULE ORAL at 11:12

## 2020-04-14 RX ADMIN — SODIUM CHLORIDE 30 MILLILITER(S): 9 INJECTION, SOLUTION INTRAVENOUS at 13:14

## 2020-04-14 RX ADMIN — QUETIAPINE FUMARATE 25 MILLIGRAM(S): 200 TABLET, FILM COATED ORAL at 10:42

## 2020-04-14 RX ADMIN — Medication 1000 UNIT(S): at 11:12

## 2020-04-14 RX ADMIN — ENOXAPARIN SODIUM 40 MILLIGRAM(S): 100 INJECTION SUBCUTANEOUS at 11:12

## 2020-04-14 RX ADMIN — Medication 400 MILLIGRAM(S): at 16:16

## 2020-04-14 RX ADMIN — Medication 12.5 GRAM(S): at 12:15

## 2020-04-14 RX ADMIN — Medication 125 MICROGRAM(S): at 06:27

## 2020-04-14 RX ADMIN — DONEPEZIL HYDROCHLORIDE 10 MILLIGRAM(S): 10 TABLET, FILM COATED ORAL at 21:12

## 2020-04-14 RX ADMIN — Medication 600 MILLIGRAM(S): at 06:27

## 2020-04-14 RX ADMIN — Medication 1: at 18:17

## 2020-04-14 RX ADMIN — ENOXAPARIN SODIUM 20 MILLIGRAM(S): 100 INJECTION SUBCUTANEOUS at 19:35

## 2020-04-14 NOTE — PROGRESS NOTE ADULT - ATTENDING COMMENTS
case d/w granddaughter, updates give,   Advance care directives discussed, wants her to be full code at this time, explained to her  if it comes to that point she will probably not be a candidate for intubation based on her age and comorbidities Advance care directives : Granddaughter  wants her to be full code at this time, explained to her  if it comes to that point she will probably not be a candidate for intubation based on her age and comorbidities

## 2020-04-14 NOTE — PROGRESS NOTE ADULT - ASSESSMENT
89 yo F full code with pmhx of hypothyroidism, dementia, HTN admitted for respiratory failure likely 2/2 COVID

## 2020-04-14 NOTE — PROGRESS NOTE ADULT - PROBLEM SELECTOR PLAN 5
- DVT ppx: lovenox 30  - Diet: pt needs bedside swallow; will start dysphagia 3 w/ honey consistency liquid  - Code status: per HCP pt full code for now; - c/w synthroid 125mcg

## 2020-04-14 NOTE — DISCHARGE NOTE PROVIDER - NSDCCPCAREPLAN_GEN_ALL_CORE_FT
PRINCIPAL DISCHARGE DIAGNOSIS  Diagnosis: Infection due to 2019 novel coronavirus  Assessment and Plan of Treatment:       SECONDARY DISCHARGE DIAGNOSES  Diagnosis: Shortness of breath  Assessment and Plan of Treatment:

## 2020-04-14 NOTE — DISCHARGE NOTE PROVIDER - NSDCMRMEDTOKEN_GEN_ALL_CORE_FT
calcium carbonate 600 mg oral tablet: 600 milligram(s) orally 2 times a day  cyanocobalamin 500 mcg oral tablet: 1000 milligram(s) orally once a day  donepezil 10 mg oral tablet: 1 tab(s) orally once a day (at bedtime)  Fish Oil 1000 mg oral capsule: 1000 milligram(s) orally once a day (at bedtime)  Fosamax 70 mg oral tablet: 1 tab(s) orally once a week  levothyroxine 125 mcg (0.125 mg) oral tablet: 1 tab(s) orally once a day  magnesium oxide 500 mg oral tablet: 1 tab(s) orally once a day  QUEtiapine:   Seroquel 25 mg oral tablet: 25  orally 2 times a day  simvastatin 20 mg oral tablet: 1 tab(s) orally once a day (at bedtime)  Vitamin D3 1000 intl units oral tablet: 1 tab(s) orally once a day

## 2020-04-14 NOTE — DISCHARGE NOTE PROVIDER - HOSPITAL COURSE
89 yo F full code with pmhx of hypothyroidism, dementia, HTN admitted for respiratory failure.  COVID swab__. pt tested positive outpatient 89 yo F full code with pmhx of hypothyroidism, dementia, HTN admitted for respiratory failure.  COVID swab__. pt tested positive outpatient        Attending Addendum: patient  on 20, please refer to discharge note for expires  patient

## 2020-04-14 NOTE — CONSULT NOTE ADULT - ASSESSMENT
EKG: afib with vent rate of 71 QTc 439 ms     Assessment and Plan     1) Afib: acceptable ventricular response, metoprolol 2.5 mg PRN for HR>120 after detailed discussion with granddaughter who is a Pediatrician joint decision was made to start a/c for thromboembolic protection as pt had a good quality of life prior  to admission, and performs ADL's with some help as per family, will hold off on transfer to tele floor as HR acceptable     2) COVID 19 PNA: on Hydroxychloroquine please obtain ekg tomorrow     3) Elevated D dimer : will start lovenox 1mg/kg for afib and elevated ddimer with covid infection     4) Elevated BNP:  monitor volume status. currently euvolemic on exam      DVT pPX will be on full a/c

## 2020-04-14 NOTE — PROGRESS NOTE ADULT - PROBLEM SELECTOR PLAN 3
- c/w donepezil pt noted to have afib   cardiology eval requested , case discussed , as HR controlled no urgent need to transfer to Tele floor  will f/u card recommendation for A/C

## 2020-04-14 NOTE — PROGRESS NOTE ADULT - PROBLEM SELECTOR PLAN 1
- c/w supplemental O2 to maintain SpO2 > 92%  - per granddaughter pt had never received Plaquenil   - not on NRB - will hold off on steroids   - albuterol inhaler PRN  - will trend inflammatory markers and lactate  -mild hyponatremia and transaminitis secondary to COVID infection, will continue to monitor - c/w supplemental O2 to maintain SpO2 > 92%  - per granddaughter pt had never received Plaquenil , will start Plaquenil as Qtc 439  - not on NRB - will hold off on steroids   - albuterol inhaler PRN  - will trend inflammatory markers and lactate  -mild hyponatremia resolved  - transaminitis secondary to COVID infection, will continue to monitor

## 2020-04-14 NOTE — CONSULT NOTE ADULT - SUBJECTIVE AND OBJECTIVE BOX
Damon Page MD  Interventional Cardiology / Endovascular Specialist  [place] Office : [address] [place] [address] [place] [address]  Tel:   [place] Office : [address] [facility] [place] [address]  Tel: [phone]  Cell : [phone]      HISTORY OF PRESENTING ILLNESS: (History obtained from chart and grand daughter     89 yo F full code with pmhx of hypothyroidism, dementia, HTN comes from home via EMS due to shortness of breath. As per the granddaughter who is a pediatrician - pt has been coughing, has fever and had low spo2 at home today. At baseline pt has regressed to only speaking Czech, suffered from loss of memory and is able to have a conversation sometimes. Pt was tested for COVID at home - positive.  Unable to obtain any hx from patient    Upon arrival to the ED triage pt. was 70% on room air as per ems and was moaning, not answering questions    ED triage vitals:  130 mm Hg	  83 mm Hg	  97 /min	   28 /min	  100 %	  mask, nonrebreather	  10 L/min	    Pt was given a 500 cc NS bolus and 1x dose of 40 IV Solu-medrol in the ED   found to have new onset afib  , no history as per granddaughter , at home does ADL as per daughter with some help,     PAST MEDICAL & SURGICAL HISTORY:  Dementia  Hypothyroid  HTN (hypertension)  H/O knee surgery      SOCIAL HISTORY: Substance Use (street drugs): ( x ) never used  (  ) other:    FAMILY HISTORY:  No pertinent family history in first degree relatives      REVIEW OF SYSTEMS:  unable to obtain     MEDICATIONS:  enoxaparin Injectable 65 milliGRAM(s) SubCutaneous two times a day  hydroxychloroquine   Oral   hydroxychloroquine 400 milliGRAM(s) Oral every 12 hours  donepezil 10 milliGRAM(s) Oral at bedtime  QUEtiapine 25 milliGRAM(s) Oral <User Schedule>  dextrose 40% Gel 15 Gram(s) Oral once PRN  dextrose 50% Injectable 12.5 Gram(s) IV Push once  dextrose 50% Injectable 25 Gram(s) IV Push once  dextrose 50% Injectable 25 Gram(s) IV Push once  glucagon  Injectable 1 milliGRAM(s) IntraMuscular once PRN  insulin lispro (HumaLOG) corrective regimen sliding scale   SubCutaneous three times a day before meals  levothyroxine 125 MICROGram(s) Oral daily  calcium carbonate   Suspension 600 milliGRAM(s) Oral every 12 hours  cholecalciferol 1000 Unit(s) Oral daily  cyanocobalamin 1000 MICROGram(s) Oral daily  dextrose 5%. 1000 milliLiter(s) IV Continuous <Continuous>  dextrose 5%. 1000 milliLiter(s) IV Continuous <Continuous>      FAMILY HISTORY:  No pertinent family history in first degree relatives        Allergies    No Known Allergies    Intolerances    	      PHYSICAL EXAM:  T(C): 36.6 (04-14-20 @ 21:13), Max: 36.6 (04-14-20 @ 21:13)  HR: 79 (04-14-20 @ 21:13) (79 - 95)  BP: 121/79 (04-14-20 @ 21:13) (100/69 - 121/79)  RR: 28 (04-14-20 @ 21:13) (19 - 28)  SpO2: 96% (04-14-20 @ 21:13) (79% - 96%)  Wt(kg): --  I&O's Summary      GENERAL: lethargic   Cardiovascular: Normal S1 S2, No JVD, systolic murmur +   Respiratory: b/l basal creps 	  Gastrointestinal:  Soft, Non-tender, + BS	  Extremities: No clubbing, cyanosis or edema        LABS:	 	    CARDIAC MARKERS:                                  12.5   5.59  )-----------( 215      ( 13 Apr 2020 14:30 )             38.6     04-14    138  |  99  |  34<H>  ----------------------------<  127<H>  4.8   |  20<L>  |  0.81    Ca    9.7      14 Apr 2020 06:20  Phos  4.1     04-14  Mg     2.3     04-14    TPro  6.9  /  Alb  3.6  /  TBili  0.6  /  DBili  x   /  AST  60<H>  /  ALT  46<H>  /  AlkPhos  53  04-13    proBNP:   Lipid Profile:   HgA1c: Hemoglobin A1C, Whole Blood: 6.1 % (04-14 @ 06:20)    TSH:     Consultant(s) Notes Reviewed:  [x ] YES  [ ] NO    Care Discussed with Consultants/Other Providers [ x] YES  [ ] NO    Imaging Personally Reviewed independently:  [x] YES  [ ] NO    All labs, radiologic studies, vitals, orders and medications list reviewed. Patient is seen and examined at bedside. Case discussed with medical team.    ASSESSMENT/PLAN:

## 2020-04-14 NOTE — PROGRESS NOTE ADULT - SUBJECTIVE AND OBJECTIVE BOX
Patient is a 89y old  Female who presents with a chief complaint of Hypoxic Respiratory Failure (14 Apr 2020 08:21)      SUBJECTIVE / OVERNIGHT EVENTS:    MEDICATIONS  (STANDING):  calcium carbonate   Suspension 600 milliGRAM(s) Oral every 12 hours  cholecalciferol 1000 Unit(s) Oral daily  cyanocobalamin 1000 MICROGram(s) Oral daily  dextrose 5%. 1000 milliLiter(s) (50 mL/Hr) IV Continuous <Continuous>  dextrose 50% Injectable 12.5 Gram(s) IV Push once  dextrose 50% Injectable 25 Gram(s) IV Push once  dextrose 50% Injectable 25 Gram(s) IV Push once  donepezil 10 milliGRAM(s) Oral at bedtime  enoxaparin Injectable 40 milliGRAM(s) SubCutaneous daily  insulin lispro (HumaLOG) corrective regimen sliding scale   SubCutaneous three times a day before meals  levothyroxine 125 MICROGram(s) Oral daily  QUEtiapine 25 milliGRAM(s) Oral <User Schedule>    MEDICATIONS  (PRN):  dextrose 40% Gel 15 Gram(s) Oral once PRN Blood Glucose LESS THAN 70 milliGRAM(s)/deciliter  glucagon  Injectable 1 milliGRAM(s) IntraMuscular once PRN Glucose LESS THAN 70 milligrams/deciliter      Vital Signs Last 24 Hrs  T(C): 36.3 (14 Apr 2020 06:24), Max: 36.3 (13 Apr 2020 18:45)  T(F): 97.4 (14 Apr 2020 06:24), Max: 97.4 (14 Apr 2020 06:24)  HR: 95 (14 Apr 2020 06:24) (72 - 95)  BP: 100/69 (14 Apr 2020 06:24) (100/69 - 131/61)  BP(mean): --  RR: 19 (14 Apr 2020 06:24) (19 - 22)  SpO2: 90% (14 Apr 2020 06:24) (90% - 90%)  CAPILLARY BLOOD GLUCOSE      POCT Blood Glucose.: 111 mg/dL (14 Apr 2020 08:45)  POCT Blood Glucose.: 128 mg/dL (13 Apr 2020 22:18)  POCT Blood Glucose.: 157 mg/dL (13 Apr 2020 18:08)  POCT Blood Glucose.: 193 mg/dL (13 Apr 2020 12:14)    I&O's Summary      PHYSICAL EXAM:  GENERAL: NAD, well-developed  HEAD:  Atraumatic, Normocephalic  EYES: EOMI, PERRLA, conjunctiva and sclera clear  NECK: Supple, No JVD  CHEST/LUNG: Clear to auscultation bilaterally; No wheeze  HEART: Regular rate and rhythm; No murmurs, rubs, or gallops  ABDOMEN: Soft, Nontender, Nondistended; Bowel sounds present  EXTREMITIES:  2+ Peripheral Pulses, No clubbing, cyanosis, or edema  PSYCH: AAOx3  NEUROLOGY: non-focal  SKIN: No rashes or lesions    LABS:                        12.5   5.59  )-----------( 215      ( 13 Apr 2020 14:30 )             38.6     04-14    138  |  99  |  34<H>  ----------------------------<  127<H>  4.8   |  20<L>  |  0.81    Ca    9.7      14 Apr 2020 06:20  Phos  4.1     04-14  Mg     2.3     04-14    TPro  6.9  /  Alb  3.6  /  TBili  0.6  /  DBili  x   /  AST  60<H>  /  ALT  46<H>  /  AlkPhos  53  04-13              RADIOLOGY & ADDITIONAL TESTS:    Imaging Personally Reviewed:    Consultant(s) Notes Reviewed:      Care Discussed with Consultants/Other Providers: Patient is a 89y old  Female who presents with a chief complaint of Hypoxic Respiratory Failure (14 Apr 2020 08:21)      SUBJECTIVE / OVERNIGHT EVENTS: patient seen and examined by bedside, pt noted to have Atrial fibrillation new onset on EKG, rate controlled, no acute distress noted       MEDICATIONS  (STANDING):  calcium carbonate   Suspension 600 milliGRAM(s) Oral every 12 hours  cholecalciferol 1000 Unit(s) Oral daily  cyanocobalamin 1000 MICROGram(s) Oral daily  dextrose 5%. 1000 milliLiter(s) (50 mL/Hr) IV Continuous <Continuous>  dextrose 50% Injectable 12.5 Gram(s) IV Push once  dextrose 50% Injectable 25 Gram(s) IV Push once  dextrose 50% Injectable 25 Gram(s) IV Push once  donepezil 10 milliGRAM(s) Oral at bedtime  enoxaparin Injectable 40 milliGRAM(s) SubCutaneous daily  insulin lispro (HumaLOG) corrective regimen sliding scale   SubCutaneous three times a day before meals  levothyroxine 125 MICROGram(s) Oral daily  QUEtiapine 25 milliGRAM(s) Oral <User Schedule>    MEDICATIONS  (PRN):  dextrose 40% Gel 15 Gram(s) Oral once PRN Blood Glucose LESS THAN 70 milliGRAM(s)/deciliter  glucagon  Injectable 1 milliGRAM(s) IntraMuscular once PRN Glucose LESS THAN 70 milligrams/deciliter      Vital Signs Last 24 Hrs  T(C): 36.3 (14 Apr 2020 06:24), Max: 36.3 (13 Apr 2020 18:45)  T(F): 97.4 (14 Apr 2020 06:24), Max: 97.4 (14 Apr 2020 06:24)  HR: 95 (14 Apr 2020 06:24) (72 - 95)  BP: 100/69 (14 Apr 2020 06:24) (100/69 - 131/61)  BP(mean): --  RR: 19 (14 Apr 2020 06:24) (19 - 22)  SpO2: 90% (14 Apr 2020 06:24) (90% - 90%)  CAPILLARY BLOOD GLUCOSE      POCT Blood Glucose.: 111 mg/dL (14 Apr 2020 08:45)  POCT Blood Glucose.: 128 mg/dL (13 Apr 2020 22:18)  POCT Blood Glucose.: 157 mg/dL (13 Apr 2020 18:08)  POCT Blood Glucose.: 193 mg/dL (13 Apr 2020 12:14)    I&O's Summary      PHYSICAL EXAM:  GENERAL: NAD  HEENT; mild ecchymosis on the rt cheek improving   CHEST/LUNG: mild crackles at bases  HEART: Regular rate and rhythm;   ABDOMEN: Soft, Nontender, Nondistended  EXTREMITIES: No clubbing, cyanosis, or edema  PSYCH: calm   NEUROLOGY: awake, moving all four extremities spontaneously    LABS:                        12.5   5.59  )-----------( 215      ( 13 Apr 2020 14:30 )             38.6     04-14    138  |  99  |  34<H>  ----------------------------<  127<H>  4.8   |  20<L>  |  0.81    Ca    9.7      14 Apr 2020 06:20  Phos  4.1     04-14  Mg     2.3     04-14    TPro  6.9  /  Alb  3.6  /  TBili  0.6  /  DBili  x   /  AST  60<H>  /  ALT  46<H>  /  AlkPhos  53  04-13              RADIOLOGY & ADDITIONAL TESTS:    Imaging Personally Reviewed:    Consultant(s) Notes Reviewed:      Care Discussed with Consultants/Other Providers:

## 2020-04-14 NOTE — PROGRESS NOTE ADULT - PROBLEM SELECTOR PLAN 2
- pt tested positive outpatient  - no indication for steroids or anakinra   - qtc on last EKG  468 , will repeat in am - pt tested positive outpatient  - no indication for steroids or anakinra   - qtc on last EKG  439, will start Plaquenil

## 2020-04-15 LAB
GLUCOSE BLDC GLUCOMTR-MCNC: 166 MG/DL — HIGH (ref 70–99)
GLUCOSE BLDC GLUCOMTR-MCNC: 177 MG/DL — HIGH (ref 70–99)
GLUCOSE BLDC GLUCOMTR-MCNC: 178 MG/DL — HIGH (ref 70–99)
GLUCOSE BLDC GLUCOMTR-MCNC: 199 MG/DL — HIGH (ref 70–99)
GLUCOSE BLDC GLUCOMTR-MCNC: 200 MG/DL — HIGH (ref 70–99)

## 2020-04-15 PROCEDURE — 93010 ELECTROCARDIOGRAM REPORT: CPT

## 2020-04-15 PROCEDURE — 71045 X-RAY EXAM CHEST 1 VIEW: CPT | Mod: 26

## 2020-04-15 PROCEDURE — 99233 SBSQ HOSP IP/OBS HIGH 50: CPT

## 2020-04-15 RX ORDER — ANAKINRA 100MG/0.67
SYRINGE (ML) SUBCUTANEOUS
Refills: 0 | Status: DISCONTINUED | OUTPATIENT
Start: 2020-04-15 | End: 2020-04-18

## 2020-04-15 RX ORDER — LANOLIN ALCOHOL/MO/W.PET/CERES
3 CREAM (GRAM) TOPICAL AT BEDTIME
Refills: 0 | Status: DISCONTINUED | OUTPATIENT
Start: 2020-04-15 | End: 2020-04-18

## 2020-04-15 RX ORDER — ANAKINRA 100MG/0.67
100 SYRINGE (ML) SUBCUTANEOUS EVERY 24 HOURS
Refills: 0 | Status: CANCELLED | OUTPATIENT
Start: 2020-04-22 | End: 2020-04-18

## 2020-04-15 RX ORDER — HALOPERIDOL DECANOATE 100 MG/ML
1 INJECTION INTRAMUSCULAR ONCE
Refills: 0 | Status: COMPLETED | OUTPATIENT
Start: 2020-04-15 | End: 2020-04-15

## 2020-04-15 RX ORDER — HYDROMORPHONE HYDROCHLORIDE 2 MG/ML
0.5 INJECTION INTRAMUSCULAR; INTRAVENOUS; SUBCUTANEOUS EVERY 6 HOURS
Refills: 0 | Status: DISCONTINUED | OUTPATIENT
Start: 2020-04-15 | End: 2020-04-16

## 2020-04-15 RX ORDER — ANAKINRA 100MG/0.67
100 SYRINGE (ML) SUBCUTANEOUS EVERY 6 HOURS
Refills: 0 | Status: DISCONTINUED | OUTPATIENT
Start: 2020-04-15 | End: 2020-04-18

## 2020-04-15 RX ORDER — ACETAMINOPHEN 500 MG
650 TABLET ORAL ONCE
Refills: 0 | Status: COMPLETED | OUTPATIENT
Start: 2020-04-15 | End: 2020-04-15

## 2020-04-15 RX ORDER — ANAKINRA 100MG/0.67
100 SYRINGE (ML) SUBCUTANEOUS EVERY 12 HOURS
Refills: 0 | Status: CANCELLED | OUTPATIENT
Start: 2020-04-19 | End: 2020-04-18

## 2020-04-15 RX ADMIN — Medication 100 MILLIGRAM(S): at 18:32

## 2020-04-15 RX ADMIN — Medication 1: at 18:32

## 2020-04-15 RX ADMIN — Medication 1: at 13:21

## 2020-04-15 RX ADMIN — Medication 650 MILLIGRAM(S): at 13:45

## 2020-04-15 RX ADMIN — HYDROMORPHONE HYDROCHLORIDE 0.5 MILLIGRAM(S): 2 INJECTION INTRAMUSCULAR; INTRAVENOUS; SUBCUTANEOUS at 18:45

## 2020-04-15 RX ADMIN — HALOPERIDOL DECANOATE 1 MILLIGRAM(S): 100 INJECTION INTRAMUSCULAR at 03:01

## 2020-04-15 RX ADMIN — Medication 400 MILLIGRAM(S): at 03:03

## 2020-04-15 RX ADMIN — Medication 3 MILLIGRAM(S): at 00:37

## 2020-04-15 RX ADMIN — ENOXAPARIN SODIUM 65 MILLIGRAM(S): 100 INJECTION SUBCUTANEOUS at 18:33

## 2020-04-15 RX ADMIN — ENOXAPARIN SODIUM 65 MILLIGRAM(S): 100 INJECTION SUBCUTANEOUS at 06:04

## 2020-04-15 NOTE — PROGRESS NOTE ADULT - ASSESSMENT
EKG: afib with vent rate of 71 QTc 439 ms     Assessment and Plan     1) Afib: acceptable ventricular response, metoprolol 2.5 mg PRN for HR>120 after detailed discussion with granddaughter c/w a/c     2) COVID 19 PNA: on Hydroxychloroquine monitor QTc , worsening hypoxia , Discussed with daughter that pt currently on % fio2, if condition worsens  intubation would be futile given pts age and comorbidities     3) Elevated D dimer : lovenox 1mg/kg for afib and elevated ddimer with covid infection     4) Elevated BNP:  monitor volume status. currently euvolemic on exam      DVT pPX will be on full a/c

## 2020-04-15 NOTE — PROGRESS NOTE ADULT - SUBJECTIVE AND OBJECTIVE BOX
Patient is a 89y old  Female who presents with a chief complaint of Hypoxic Respiratory Failure (14 Apr 2020 16:15)      SUBJECTIVE / OVERNIGHT EVENTS: patient seen and examined by bedside at 12:10 PM, pt noted to be on NRB mask, lethargic , not responding to  verbal or tactile stimuli   pt had desaturated on NC yesterday evening and was started on 100% O2via  NRB        MEDICATIONS  (STANDING):  acetaminophen  Suppository .. 650 milliGRAM(s) Rectal once  anakinra Injectable   SubCutaneous   anakinra Injectable 100 milliGRAM(s) SubCutaneous every 6 hours  calcium carbonate   Suspension 600 milliGRAM(s) Oral every 12 hours  cholecalciferol 1000 Unit(s) Oral daily  cyanocobalamin 1000 MICROGram(s) Oral daily  dextrose 5%. 1000 milliLiter(s) (50 mL/Hr) IV Continuous <Continuous>  dextrose 5%. 1000 milliLiter(s) (30 mL/Hr) IV Continuous <Continuous>  dextrose 50% Injectable 12.5 Gram(s) IV Push once  dextrose 50% Injectable 25 Gram(s) IV Push once  dextrose 50% Injectable 25 Gram(s) IV Push once  donepezil 10 milliGRAM(s) Oral at bedtime  enoxaparin Injectable 65 milliGRAM(s) SubCutaneous two times a day  HYDROmorphone  Injectable 0.5 milliGRAM(s) IV Push every 6 hours  hydroxychloroquine   Oral   hydroxychloroquine 200 milliGRAM(s) Oral every 12 hours  insulin lispro (HumaLOG) corrective regimen sliding scale   SubCutaneous three times a day before meals  levothyroxine 125 MICROGram(s) Oral daily  melatonin 3 milliGRAM(s) Oral at bedtime  QUEtiapine 25 milliGRAM(s) Oral <User Schedule>    MEDICATIONS  (PRN):  dextrose 40% Gel 15 Gram(s) Oral once PRN Blood Glucose LESS THAN 70 milliGRAM(s)/deciliter  glucagon  Injectable 1 milliGRAM(s) IntraMuscular once PRN Glucose LESS THAN 70 milligrams/deciliter      Vital Signs Last 24 Hrs  T(C): 36.4 (15 Apr 2020 10:07), Max: 37.1 (15 Apr 2020 05:57)  T(F): 97.6 (15 Apr 2020 10:07), Max: 98.7 (15 Apr 2020 05:57)  HR: 74 (15 Apr 2020 10:07) (74 - 79)  BP: 104/74 (15 Apr 2020 10:07) (104/74 - 125/83)  BP(mean): --  RR: 28 (15 Apr 2020 10:07) (24 - 28)  SpO2: 94% (15 Apr 2020 10:07) (79% - 96%)  CAPILLARY BLOOD GLUCOSE      POCT Blood Glucose.: 178 mg/dL (15 Apr 2020 12:24)  POCT Blood Glucose.: 166 mg/dL (15 Apr 2020 09:36)  POCT Blood Glucose.: 199 mg/dL (15 Apr 2020 08:18)  POCT Blood Glucose.: 154 mg/dL (14 Apr 2020 21:19)  POCT Blood Glucose.: 184 mg/dL (14 Apr 2020 17:49)    I&O's Summary        PHYSICAL EXAM:  GENERAL: lethargic not responding to verbal and tactile stimuli   HEENT; mild ecchymosis on the rt cheek improving   CHEST/LUNG: tachypneic  with NRB mask,   crackles at bases  HEART: irregular rate and rhythm;   ABDOMEN: Soft, Nontender, Nondistended  EXTREMITIES: No clubbing, cyanosis, or edema  NEUROLOGY: lethargic , minimally responsive     LABS:                        12.5   5.59  )-----------( 215      ( 13 Apr 2020 14:30 )             38.6     04-14    138  |  99  |  34<H>  ----------------------------<  127<H>  4.8   |  20<L>  |  0.81    Ca    9.7      14 Apr 2020 06:20  Phos  4.1     04-14  Mg     2.3     04-14    TPro  6.9  /  Alb  3.6  /  TBili  0.6  /  DBili  x   /  AST  60<H>  /  ALT  46<H>  /  AlkPhos  53  04-13              RADIOLOGY & ADDITIONAL TESTS:    Imaging Personally Reviewed:    Consultant(s) Notes Reviewed:      Care Discussed with Consultants/Other Providers:

## 2020-04-15 NOTE — PROGRESS NOTE ADULT - ATTENDING COMMENTS
Advance care directives : Granddaughter  wants her to be full code at this time, explained to her  if it comes to that point she will probably not be a candidate for intubation based on her age and comorbidities    Addendum: RRT was called on patient at 1:10 Pm for hypoxia, Called grand daughter , case discussed, explained that intubation will be medically and ethically inappropriate considering her age and comorbid conditions   granddaughter does not I phone cannot facetime, wants to visit grandmother, spoke to Dr yao,  granddaughter permitted to visit

## 2020-04-15 NOTE — PROGRESS NOTE ADULT - SUBJECTIVE AND OBJECTIVE BOX
Damon Page MD  Interventional Cardiology / Endovascular Specialist  [place] Office : [address] [place] [address] [place] [address]  Tel:   [place] Office : [address] [facility] [place] [address]  Tel: [phone]  Cell : [phone]    HISTORY OF PRESENTING ILLNESS:    89 yo F full code with pmhx of hypothyroidism, dementia, HTN comes from home via EMS due to shortness of breath. found to be COVID + and developed Afib   	  MEDICATIONS:  enoxaparin Injectable 65 milliGRAM(s) SubCutaneous two times a day    hydroxychloroquine   Oral   hydroxychloroquine 200 milliGRAM(s) Oral every 12 hours      donepezil 10 milliGRAM(s) Oral at bedtime  HYDROmorphone  Injectable 0.5 milliGRAM(s) IV Push every 6 hours  melatonin 3 milliGRAM(s) Oral at bedtime  QUEtiapine 25 milliGRAM(s) Oral <User Schedule>      dextrose 40% Gel 15 Gram(s) Oral once PRN  dextrose 50% Injectable 12.5 Gram(s) IV Push once  dextrose 50% Injectable 25 Gram(s) IV Push once  dextrose 50% Injectable 25 Gram(s) IV Push once  glucagon  Injectable 1 milliGRAM(s) IntraMuscular once PRN  insulin lispro (HumaLOG) corrective regimen sliding scale   SubCutaneous three times a day before meals  levothyroxine 125 MICROGram(s) Oral daily    calcium carbonate   Suspension 600 milliGRAM(s) Oral every 12 hours  cholecalciferol 1000 Unit(s) Oral daily  cyanocobalamin 1000 MICROGram(s) Oral daily  dextrose 5%. 1000 milliLiter(s) IV Continuous <Continuous>  dextrose 5%. 1000 milliLiter(s) IV Continuous <Continuous>      PAST MEDICAL/SURGICAL HISTORY  PAST MEDICAL & SURGICAL HISTORY:  Dementia  Hypothyroid  HTN (hypertension)  H/O knee surgery      SOCIAL HISTORY: Substance Use (street drugs): ( x ) never used  (  ) other:    FAMILY HISTORY:  No pertinent family history in first degree relatives      REVIEW OF SYSTEMS:  CONSTITUTIONAL: No fever, weight loss, or fatigue  EYES: No eye pain, visual disturbances, or discharge  ENMT:  No difficulty hearing, tinnitus, vertigo; No sinus or throat pain  BREASTS: No pain, masses, or nipple discharge  GASTROINTESTINAL: No abdominal or epigastric pain. No nausea, vomiting, or hematemesis; No diarrhea or constipation. No melena or hematochezia.  GENITOURINARY: No dysuria, frequency, hematuria, or incontinence  NEUROLOGICAL: No headaches, memory loss, loss of strength, numbness, or tremors  ENDOCRINE: No heat or cold intolerance; No hair loss  MUSCULOSKELETAL: No joint pain or swelling; No muscle, back, or extremity pain  PSYCHIATRIC: No depression, anxiety, mood swings, or difficulty sleeping  HEME/LYMPH: No easy bruising, or bleeding gums  All others negative    PHYSICAL EXAM:  T(C): 36.4 (04-15-20 @ 10:07), Max: 37.1 (04-15-20 @ 05:57)  HR: 74 (04-15-20 @ 10:07) (74 - 79)  BP: 104/74 (04-15-20 @ 10:07) (104/74 - 125/83)  RR: 28 (04-15-20 @ 10:07) (24 - 28)  SpO2: 94% (04-15-20 @ 10:07) (79% - 96%)  Wt(kg): --  I&O's Summary    GENERAL: lethargic in resp distress   Cardiovascular: Normal S1 S2, No JVD, systolic murmur +   Respiratory: b/l basal creps 	  Gastrointestinal:  Soft, Non-tender, + BS	  Extremities: No clubbing, cyanosis or edema            04-14    138  |  99  |  34<H>  ----------------------------<  127<H>  4.8   |  20<L>  |  0.81    Ca    9.7      14 Apr 2020 06:20  Phos  4.1     04-14  Mg     2.3     04-14      proBNP:   Lipid Profile:   HgA1c:   TSH:     Consultant(s) Notes Reviewed:  [x ] YES  [ ] NO    Care Discussed with Consultants/Other Providers [ x] YES  [ ] NO    Imaging Personally Reviewed independently:  [x] YES  [ ] NO    All labs, radiologic studies, vitals, orders and medications list reviewed. Patient is seen and examined at bedside. Case discussed with medical team.

## 2020-04-15 NOTE — PROGRESS NOTE ADULT - ASSESSMENT
89 yo F full code with pmhx of hypothyroidism, dementia, HTN admitted for respiratory failure likely 2/2 COVID , Pt desaturated on NC, was transitioned to NRB, pt now lethargic and tachypneic

## 2020-04-15 NOTE — CHART NOTE - NSCHARTNOTEFT_GEN_A_CORE
RRT called pulse ox down to 88%.  Spoke to family given the age, Dementia, comorbidity, we are not offering Intubation.  Family notified by PMD.  Maryana MCALLSITER RRT called pulse ox down to 88%.  Spoke to family given the age, Dementia, comorbidity, we are not offering Intubation.  Family notified by PMD. Pulse ox 82% on NRB. Maryana MCALLISTER

## 2020-04-15 NOTE — RAPID RESPONSE TEAM SUMMARY - NSSITUATIONBACKGROUNDRRT_GEN_ALL_CORE
89F with pmhx of hypothyroidism, dementia, HTN admitted for respiratory failure likely 2/2 COVID .    RRT called for hypoxia and hypotension. Upon evaluation, patient found to be agonally breathing. Patients O2 84% and appears in respiratory distress.  After discussion with attending, it was deemed it would be medically futile for intubation. Attending spoke with patient's family who is aware of plan.     Primary team to assume further care.

## 2020-04-15 NOTE — PROVIDER CONTACT NOTE (OTHER) - SITUATION
Patient's blood glucose 199. Rechecked and went down to 166. Currently on dextrose 5% continuous. Patient appetite poor. Patient O2 went down to 88% on nonrebreather.

## 2020-04-15 NOTE — PROGRESS NOTE ADULT - PROBLEM SELECTOR PLAN 1
-worsening respiratory status , on NRB mask, lethargic,  steroid and Anakinra , added , case d/w ID Dr Aponte, recommend either steroid or Anakinra not both, will DC steroid   - Plaquenil started on 4/14,  Qtc was  439, will repeat as pt on Seroquel also   - albuterol inhaler PRN  - will trend inflammatory markers and lactate  -mild hyponatremia resolved  - transaminitis secondary to COVID infection, will continue to monitor

## 2020-04-15 NOTE — PROGRESS NOTE ADULT - PROBLEM SELECTOR PLAN 3
pt noted to have afib   cardiology eval requested , case discussed , as HR controlled no urgent need to transfer to Tele floor  -Cardiology had discussed with family and started on A/C

## 2020-04-16 LAB
ALBUMIN SERPL ELPH-MCNC: 3.2 G/DL — LOW (ref 3.3–5)
ALP SERPL-CCNC: 72 U/L — SIGNIFICANT CHANGE UP (ref 40–120)
ALT FLD-CCNC: 171 U/L — HIGH (ref 4–33)
ANION GAP SERPL CALC-SCNC: 15 MMO/L — HIGH (ref 7–14)
AST SERPL-CCNC: 206 U/L — HIGH (ref 4–32)
BASOPHILS # BLD AUTO: 0.03 K/UL — SIGNIFICANT CHANGE UP (ref 0–0.2)
BASOPHILS NFR BLD AUTO: 0.3 % — SIGNIFICANT CHANGE UP (ref 0–2)
BILIRUB SERPL-MCNC: 0.8 MG/DL — SIGNIFICANT CHANGE UP (ref 0.2–1.2)
BUN SERPL-MCNC: 54 MG/DL — HIGH (ref 7–23)
CALCIUM SERPL-MCNC: 9.5 MG/DL — SIGNIFICANT CHANGE UP (ref 8.4–10.5)
CHLORIDE SERPL-SCNC: 101 MMOL/L — SIGNIFICANT CHANGE UP (ref 98–107)
CO2 SERPL-SCNC: 25 MMOL/L — SIGNIFICANT CHANGE UP (ref 22–31)
CREAT SERPL-MCNC: 0.94 MG/DL — SIGNIFICANT CHANGE UP (ref 0.5–1.3)
EOSINOPHIL # BLD AUTO: 0 K/UL — SIGNIFICANT CHANGE UP (ref 0–0.5)
EOSINOPHIL NFR BLD AUTO: 0 % — SIGNIFICANT CHANGE UP (ref 0–6)
GLUCOSE BLDC GLUCOMTR-MCNC: 152 MG/DL — HIGH (ref 70–99)
GLUCOSE BLDC GLUCOMTR-MCNC: 155 MG/DL — HIGH (ref 70–99)
GLUCOSE BLDC GLUCOMTR-MCNC: 156 MG/DL — HIGH (ref 70–99)
GLUCOSE BLDC GLUCOMTR-MCNC: 162 MG/DL — HIGH (ref 70–99)
GLUCOSE SERPL-MCNC: 166 MG/DL — HIGH (ref 70–99)
HCT VFR BLD CALC: 41 % — SIGNIFICANT CHANGE UP (ref 34.5–45)
HGB BLD-MCNC: 13.1 G/DL — SIGNIFICANT CHANGE UP (ref 11.5–15.5)
IMM GRANULOCYTES NFR BLD AUTO: 2.2 % — HIGH (ref 0–1.5)
LYMPHOCYTES # BLD AUTO: 0.53 K/UL — LOW (ref 1–3.3)
LYMPHOCYTES # BLD AUTO: 5.2 % — LOW (ref 13–44)
MCHC RBC-ENTMCNC: 32 % — SIGNIFICANT CHANGE UP (ref 32–36)
MCHC RBC-ENTMCNC: 32 PG — SIGNIFICANT CHANGE UP (ref 27–34)
MCV RBC AUTO: 100 FL — SIGNIFICANT CHANGE UP (ref 80–100)
MONOCYTES # BLD AUTO: 0.25 K/UL — SIGNIFICANT CHANGE UP (ref 0–0.9)
MONOCYTES NFR BLD AUTO: 2.4 % — SIGNIFICANT CHANGE UP (ref 2–14)
NEUTROPHILS # BLD AUTO: 9.2 K/UL — HIGH (ref 1.8–7.4)
NEUTROPHILS NFR BLD AUTO: 89.9 % — HIGH (ref 43–77)
NRBC # FLD: 0.2 K/UL — SIGNIFICANT CHANGE UP (ref 0–0)
NRBC FLD-RTO: 2 — SIGNIFICANT CHANGE UP
PLATELET # BLD AUTO: 233 K/UL — SIGNIFICANT CHANGE UP (ref 150–400)
PMV BLD: 12.3 FL — SIGNIFICANT CHANGE UP (ref 7–13)
POTASSIUM SERPL-MCNC: 4.9 MMOL/L — SIGNIFICANT CHANGE UP (ref 3.5–5.3)
POTASSIUM SERPL-SCNC: 4.9 MMOL/L — SIGNIFICANT CHANGE UP (ref 3.5–5.3)
PROT SERPL-MCNC: 7 G/DL — SIGNIFICANT CHANGE UP (ref 6–8.3)
RBC # BLD: 4.1 M/UL — SIGNIFICANT CHANGE UP (ref 3.8–5.2)
RBC # FLD: 13.8 % — SIGNIFICANT CHANGE UP (ref 10.3–14.5)
SODIUM SERPL-SCNC: 141 MMOL/L — SIGNIFICANT CHANGE UP (ref 135–145)
WBC # BLD: 10.23 K/UL — SIGNIFICANT CHANGE UP (ref 3.8–10.5)
WBC # FLD AUTO: 10.23 K/UL — SIGNIFICANT CHANGE UP (ref 3.8–10.5)

## 2020-04-16 PROCEDURE — 99233 SBSQ HOSP IP/OBS HIGH 50: CPT

## 2020-04-16 RX ADMIN — Medication 1: at 08:44

## 2020-04-16 RX ADMIN — ENOXAPARIN SODIUM 65 MILLIGRAM(S): 100 INJECTION SUBCUTANEOUS at 06:30

## 2020-04-16 RX ADMIN — HYDROMORPHONE HYDROCHLORIDE 0.5 MILLIGRAM(S): 2 INJECTION INTRAMUSCULAR; INTRAVENOUS; SUBCUTANEOUS at 06:32

## 2020-04-16 RX ADMIN — HYDROMORPHONE HYDROCHLORIDE 0.5 MILLIGRAM(S): 2 INJECTION INTRAMUSCULAR; INTRAVENOUS; SUBCUTANEOUS at 13:38

## 2020-04-16 RX ADMIN — Medication 100 MILLIGRAM(S): at 18:32

## 2020-04-16 RX ADMIN — Medication 100 MILLIGRAM(S): at 13:37

## 2020-04-16 RX ADMIN — HYDROMORPHONE HYDROCHLORIDE 0.5 MILLIGRAM(S): 2 INJECTION INTRAMUSCULAR; INTRAVENOUS; SUBCUTANEOUS at 00:52

## 2020-04-16 RX ADMIN — Medication 1: at 12:42

## 2020-04-16 RX ADMIN — ENOXAPARIN SODIUM 65 MILLIGRAM(S): 100 INJECTION SUBCUTANEOUS at 18:32

## 2020-04-16 RX ADMIN — Medication 100 MILLIGRAM(S): at 06:29

## 2020-04-16 RX ADMIN — Medication 100 MILLIGRAM(S): at 00:52

## 2020-04-16 RX ADMIN — Medication 1: at 18:32

## 2020-04-16 NOTE — PROGRESS NOTE ADULT - PROBLEM SELECTOR PLAN 1
-worsening respiratory status , on NRB mask, lethargic, steroid and Anakinra , added , case d/w ID Dr Aponte by prior hopsitalist, recommending either steroid or Anakinra not both, will DC steroid   - Plaquenil started on 4/14,  Qtc was  439, will repeat as pt on Seroquel also   - albuterol inhaler PRN  - will trend inflammatory markers and lactate  - mild hyponatremia resolved  - transaminitis secondary to COVID infection, will continue to monitor

## 2020-04-16 NOTE — PROGRESS NOTE ADULT - SUBJECTIVE AND OBJECTIVE BOX
Patient is a 89y old  Female who presents with a chief complaint of Hypoxic Respiratory Failure (14 Apr 2020 16:15)    SUBJECTIVE / OVERNIGHT EVENTS: Continues to require NRB 15L, intermittent desaturation to high 80s, low 90s.     MEDICATIONS  (STANDING):  anakinra Injectable   SubCutaneous   anakinra Injectable 100 milliGRAM(s) SubCutaneous every 6 hours  calcium carbonate   Suspension 600 milliGRAM(s) Oral every 12 hours  cholecalciferol 1000 Unit(s) Oral daily  cyanocobalamin 1000 MICROGram(s) Oral daily  dextrose 5%. 1000 milliLiter(s) (50 mL/Hr) IV Continuous <Continuous>  dextrose 5%. 1000 milliLiter(s) (30 mL/Hr) IV Continuous <Continuous>  dextrose 50% Injectable 12.5 Gram(s) IV Push once  dextrose 50% Injectable 25 Gram(s) IV Push once  dextrose 50% Injectable 25 Gram(s) IV Push once  donepezil 10 milliGRAM(s) Oral at bedtime  enoxaparin Injectable 65 milliGRAM(s) SubCutaneous two times a day  HYDROmorphone  Injectable 0.5 milliGRAM(s) IV Push every 6 hours  hydroxychloroquine   Oral   hydroxychloroquine 200 milliGRAM(s) Oral every 12 hours  insulin lispro (HumaLOG) corrective regimen sliding scale   SubCutaneous three times a day before meals  levothyroxine 125 MICROGram(s) Oral daily  melatonin 3 milliGRAM(s) Oral at bedtime  QUEtiapine 25 milliGRAM(s) Oral <User Schedule>    MEDICATIONS  (PRN):  dextrose 40% Gel 15 Gram(s) Oral once PRN Blood Glucose LESS THAN 70 milliGRAM(s)/deciliter  glucagon  Injectable 1 milliGRAM(s) IntraMuscular once PRN Glucose LESS THAN 70 milligrams/deciliter    Vital Signs Last 24 Hrs  T(C): 36.3 (16 Apr 2020 13:10), Max: 37.2 (15 Apr 2020 17:30)  T(F): 97.4 (16 Apr 2020 13:10), Max: 99 (15 Apr 2020 17:30)  HR: 77 (16 Apr 2020 13:10) (65 - 88)  BP: 131/66 (16 Apr 2020 13:10) (110/68 - 131/66)  BP(mean): --  RR: 22 (16 Apr 2020 07:26) (17 - 22)  SpO2: 87% (16 Apr 2020 13:10) (86% - 95%)    I&O's Detail    CAPILLARY BLOOD GLUCOSE      POCT Blood Glucose.: 155 mg/dL (16 Apr 2020 12:14)  POCT Blood Glucose.: 156 mg/dL (16 Apr 2020 08:26)  POCT Blood Glucose.: 177 mg/dL (15 Apr 2020 22:27)  POCT Blood Glucose.: 200 mg/dL (15 Apr 2020 18:03)    PHYSICAL EXAM:  GENERAL: laying in bed, NAD, minimally arousable   HEENT; mild ecchymosis on the rt cheek improving   CHEST/LUNG: tachypneic  with NRB mask, crackles at bases  HEART: irregular rate and rhythm  ABDOMEN: Soft, Nontender, Nondistended  EXTREMITIES: No clubbing, cyanosis, or edema  NEUROLOGY: lethargic , minimally responsive     LABS:                         13.1   10.23 )-----------( 233      ( 16 Apr 2020 05:59 )             41.0     04-16    141  |  101  |  54<H>  ----------------------------<  166<H>  4.9   |  25  |  0.94    Ca    9.5      16 Apr 2020 05:59    TPro  7.0  /  Alb  3.2<L>  /  TBili  0.8  /  DBili  x   /  AST  206<H>  /  ALT  171<H>  /  AlkPhos  72  04-16    RADIOLOGY & ADDITIONAL TESTS: Reviewed.

## 2020-04-16 NOTE — PROGRESS NOTE ADULT - SUBJECTIVE AND OBJECTIVE BOX
Damon Page MD  Interventional Cardiology / Endovascular Specialist  [place] Office : [address] [place] [address] [place] [address]  Tel:   [place] Office : [address] [facility] [place] [address]  Tel: [phone]  Cell : [phone]    HISTORY OF PRESENTING ILLNESS:    89 yo F full code with pmhx of hypothyroidism, dementia, HTN comes from home via EMS due to shortness of breath. found to be COVID + and developed Afib     MEDICATIONS:  enoxaparin Injectable 65 milliGRAM(s) SubCutaneous two times a day        donepezil 10 milliGRAM(s) Oral at bedtime  melatonin 3 milliGRAM(s) Oral at bedtime  QUEtiapine 25 milliGRAM(s) Oral <User Schedule>      dextrose 40% Gel 15 Gram(s) Oral once PRN  dextrose 50% Injectable 12.5 Gram(s) IV Push once  dextrose 50% Injectable 25 Gram(s) IV Push once  dextrose 50% Injectable 25 Gram(s) IV Push once  glucagon  Injectable 1 milliGRAM(s) IntraMuscular once PRN  insulin lispro (HumaLOG) corrective regimen sliding scale   SubCutaneous three times a day before meals  levothyroxine 125 MICROGram(s) Oral daily    calcium carbonate   Suspension 600 milliGRAM(s) Oral every 12 hours  cholecalciferol 1000 Unit(s) Oral daily  cyanocobalamin 1000 MICROGram(s) Oral daily  dextrose 5%. 1000 milliLiter(s) IV Continuous <Continuous>  dextrose 5%. 1000 milliLiter(s) IV Continuous <Continuous>      PAST MEDICAL/SURGICAL HISTORY  PAST MEDICAL & SURGICAL HISTORY:  Dementia  Hypothyroid  HTN (hypertension)  H/O knee surgery      SOCIAL HISTORY: Substance Use (street drugs): ( x ) never used  (  ) other:    FAMILY HISTORY:  No pertinent family history in first degree relatives      REVIEW OF SYSTEMS:  CONSTITUTIONAL: No fever, weight loss, or fatigue  EYES: No eye pain, visual disturbances, or discharge  ENMT:  No difficulty hearing, tinnitus, vertigo; No sinus or throat pain  BREASTS: No pain, masses, or nipple discharge  GASTROINTESTINAL: No abdominal or epigastric pain. No nausea, vomiting, or hematemesis; No diarrhea or constipation. No melena or hematochezia.  GENITOURINARY: No dysuria, frequency, hematuria, or incontinence  NEUROLOGICAL: No headaches, memory loss, loss of strength, numbness, or tremors  ENDOCRINE: No heat or cold intolerance; No hair loss  MUSCULOSKELETAL: No joint pain or swelling; No muscle, back, or extremity pain  PSYCHIATRIC: No depression, anxiety, mood swings, or difficulty sleeping  HEME/LYMPH: No easy bruising, or bleeding gums  All others negative    PHYSICAL EXAM:  T(C): 37.1 (04-16-20 @ 21:03), Max: 37.1 (04-16-20 @ 21:03)  HR: 80 (04-16-20 @ 21:03) (68 - 80)  BP: 121/75 (04-16-20 @ 21:03) (110/68 - 131/66)  RR: 21 (04-16-20 @ 21:03) (17 - 22)  SpO2: 89% (04-16-20 @ 21:03) (78% - 95%)  Wt(kg): --  I&O's Summary    GENERAL: lethargic in resp distress   Cardiovascular: Normal S1 S2, No JVD, systolic murmur +   Respiratory: b/l basal creps 	  Gastrointestinal:  Soft, Non-tender, + BS	  Extremities: No clubbing, cyanosis or edema                                  13.1   10.23 )-----------( 233      ( 16 Apr 2020 05:59 )             41.0     04-16    141  |  101  |  54<H>  ----------------------------<  166<H>  4.9   |  25  |  0.94    Ca    9.5      16 Apr 2020 05:59    TPro  7.0  /  Alb  3.2<L>  /  TBili  0.8  /  DBili  x   /  AST  206<H>  /  ALT  171<H>  /  AlkPhos  72  04-16    proBNP:   Lipid Profile:   HgA1c:   TSH:     Consultant(s) Notes Reviewed:  [x ] YES  [ ] NO    Care Discussed with Consultants/Other Providers [ x] YES  [ ] NO    Imaging Personally Reviewed independently:  [x] YES  [ ] NO    All labs, radiologic studies, vitals, orders and medications list reviewed. Patient is seen and examined at bedside. Case discussed with medical team.

## 2020-04-16 NOTE — PROGRESS NOTE ADULT - ATTENDING COMMENTS
Updated Granddaughter Sarah (pediatrician) via telephone Updated Granddaughter Sarah (pediatrician) via telephone who is the HCP. Updated regarding clinical status and continued desaturations despite being on maximal amount of O2 via non-rebreather. As previously discussed by prior hospitalist, intubation not being offered given extremely low chance of recovery and extubation. Granddaughter requesting to visit grandmother once more as concern for further deterioration and passing away. Approval for visitation received, granddaughter informed that visitation at her own risk who understood and still wishes to visit. Granddaughter would still like patient to have chest compressions if her heart were to stop despite multiple discussions that her grandmother almost certainly would not be able to recover and it would only prolong suffering.

## 2020-04-16 NOTE — PROGRESS NOTE ADULT - ASSESSMENT
EKG: afib with vent rate of 71 QTc 439 ms     Assessment and Plan     1) Afib: acceptable ventricular response, metoprolol 2.5 mg PRN for HR>120 after detailed discussion with granddaughter c/w a/c     2) COVID 19 PNA: on Hydroxychloroquine monitor QTc , worsening hypoxia , on % fio2     3) Elevated D dimer : lovenox 1mg/kg for afib and elevated ddimer with covid infection     4) Elevated BNP:  monitor volume status. currently euvolemic on exam      DVT pPX will be on full a/c

## 2020-04-17 LAB
GLUCOSE BLDC GLUCOMTR-MCNC: 161 MG/DL — HIGH (ref 70–99)
GLUCOSE BLDC GLUCOMTR-MCNC: 173 MG/DL — HIGH (ref 70–99)
GLUCOSE BLDC GLUCOMTR-MCNC: 181 MG/DL — HIGH (ref 70–99)
GLUCOSE BLDC GLUCOMTR-MCNC: 192 MG/DL — HIGH (ref 70–99)

## 2020-04-17 PROCEDURE — 99233 SBSQ HOSP IP/OBS HIGH 50: CPT

## 2020-04-17 RX ORDER — ACETAMINOPHEN 500 MG
1000 TABLET ORAL ONCE
Refills: 0 | Status: COMPLETED | OUTPATIENT
Start: 2020-04-17 | End: 2020-04-17

## 2020-04-17 RX ORDER — HYDROMORPHONE HYDROCHLORIDE 2 MG/ML
0.25 INJECTION INTRAMUSCULAR; INTRAVENOUS; SUBCUTANEOUS EVERY 6 HOURS
Refills: 0 | Status: DISCONTINUED | OUTPATIENT
Start: 2020-04-17 | End: 2020-04-18

## 2020-04-17 RX ADMIN — Medication 400 MILLIGRAM(S): at 21:06

## 2020-04-17 RX ADMIN — Medication 1: at 09:00

## 2020-04-17 RX ADMIN — SODIUM CHLORIDE 30 MILLILITER(S): 9 INJECTION, SOLUTION INTRAVENOUS at 16:07

## 2020-04-17 RX ADMIN — HYDROMORPHONE HYDROCHLORIDE 0.25 MILLIGRAM(S): 2 INJECTION INTRAMUSCULAR; INTRAVENOUS; SUBCUTANEOUS at 22:34

## 2020-04-17 RX ADMIN — Medication 1: at 13:10

## 2020-04-17 RX ADMIN — Medication 100 MILLIGRAM(S): at 13:11

## 2020-04-17 RX ADMIN — Medication 100 MILLIGRAM(S): at 18:17

## 2020-04-17 RX ADMIN — Medication 100 MILLIGRAM(S): at 23:02

## 2020-04-17 RX ADMIN — ENOXAPARIN SODIUM 65 MILLIGRAM(S): 100 INJECTION SUBCUTANEOUS at 18:17

## 2020-04-17 RX ADMIN — Medication 1: at 18:17

## 2020-04-17 RX ADMIN — Medication 100 MILLIGRAM(S): at 01:27

## 2020-04-17 RX ADMIN — Medication 100 MILLIGRAM(S): at 07:14

## 2020-04-17 RX ADMIN — ENOXAPARIN SODIUM 65 MILLIGRAM(S): 100 INJECTION SUBCUTANEOUS at 07:14

## 2020-04-17 NOTE — PROGRESS NOTE ADULT - SUBJECTIVE AND OBJECTIVE BOX
Patient is a 89y old  Female who presents with a chief complaint of Hypoxic Respiratory Failure (14 Apr 2020 16:15)    SUBJECTIVE / OVERNIGHT EVENTS: Continues to require NRB 15L, unable to obtain subjective    MEDICATIONS  (STANDING):  anakinra Injectable   SubCutaneous   anakinra Injectable 100 milliGRAM(s) SubCutaneous every 6 hours  calcium carbonate   Suspension 600 milliGRAM(s) Oral every 12 hours  cholecalciferol 1000 Unit(s) Oral daily  cyanocobalamin 1000 MICROGram(s) Oral daily  dextrose 5%. 1000 milliLiter(s) (50 mL/Hr) IV Continuous <Continuous>  dextrose 5%. 1000 milliLiter(s) (30 mL/Hr) IV Continuous <Continuous>  dextrose 50% Injectable 12.5 Gram(s) IV Push once  dextrose 50% Injectable 25 Gram(s) IV Push once  dextrose 50% Injectable 25 Gram(s) IV Push once  donepezil 10 milliGRAM(s) Oral at bedtime  enoxaparin Injectable 65 milliGRAM(s) SubCutaneous two times a day  insulin lispro (HumaLOG) corrective regimen sliding scale   SubCutaneous three times a day before meals  levothyroxine 125 MICROGram(s) Oral daily  melatonin 3 milliGRAM(s) Oral at bedtime  QUEtiapine 25 milliGRAM(s) Oral <User Schedule>    MEDICATIONS  (PRN):  dextrose 40% Gel 15 Gram(s) Oral once PRN Blood Glucose LESS THAN 70 milliGRAM(s)/deciliter  glucagon  Injectable 1 milliGRAM(s) IntraMuscular once PRN Glucose LESS THAN 70 milligrams/deciliter  HYDROmorphone  Injectable 0.25 milliGRAM(s) IV Push every 6 hours PRN Severe Pain (7 - 10), Air hunger    Vital Signs Last 24 Hrs  T(C): 36.4 (17 Apr 2020 13:40), Max: 37.2 (17 Apr 2020 01:29)  T(F): 97.6 (17 Apr 2020 13:40), Max: 98.9 (17 Apr 2020 01:29)  HR: 70 (17 Apr 2020 13:40) (70 - 80)  BP: 97/56 (17 Apr 2020 13:40) (97/56 - 121/75)  BP(mean): --  RR: 22 (17 Apr 2020 13:40) (21 - 22)  SpO2: 94% (17 Apr 2020 13:40) (89% - 96%)    I&O's Detail  CAPILLARY BLOOD GLUCOSE  POCT Blood Glucose.: 181 mg/dL (17 Apr 2020 12:43)  POCT Blood Glucose.: 192 mg/dL (17 Apr 2020 08:23)  POCT Blood Glucose.: 162 mg/dL (16 Apr 2020 22:26)  POCT Blood Glucose.: 152 mg/dL (16 Apr 2020 17:54)    PHYSICAL EXAM:  GENERAL: laying in bed, NAD, minimally arousable   HEENT; mild ecchymosis on the rt cheek  CHEST/LUNG: tachypnei  with NRB mask, course breath sounds  HEART: irregular rate and rhythm  ABDOMEN: Soft, Nontender, Nondistended  EXTREMITIES: No clubbing, cyanosis, or edema  NEUROLOGY: lethargic , minimally responsive     LABS:                         13.1   10.23 )-----------( 233      ( 16 Apr 2020 05:59 )             41.0     04-16    141  |  101  |  54<H>  ----------------------------<  166<H>  4.9   |  25  |  0.94    Ca    9.5      16 Apr 2020 05:59    TPro  7.0  /  Alb  3.2<L>  /  TBili  0.8  /  DBili  x   /  AST  206<H>  /  ALT  171<H>  /  AlkPhos  72  04-16    RADIOLOGY & ADDITIONAL TESTS: Reviewed.

## 2020-04-17 NOTE — PROGRESS NOTE ADULT - SUBJECTIVE AND OBJECTIVE BOX
Damon Page MD  Interventional Cardiology / Endovascular Specialist  [place] Office : [address] [place] [address] [place] [address]  Tel:   [place] Office : [address] [facility] [place] [address]  Tel: [phone]  Cell : [phone]    HISTORY OF PRESENTING ILLNESS:    89 yo F full code with pmhx of hypothyroidism, dementia, HTN comes from home via EMS due to shortness of breath. found to be COVID + and developed Afib   	  MEDICATIONS:  enoxaparin Injectable 65 milliGRAM(s) SubCutaneous two times a day  donepezil 10 milliGRAM(s) Oral at bedtime  HYDROmorphone  Injectable 0.25 milliGRAM(s) IV Push every 6 hours PRN  melatonin 3 milliGRAM(s) Oral at bedtime  QUEtiapine 25 milliGRAM(s) Oral <User Schedule>  dextrose 40% Gel 15 Gram(s) Oral once PRN  dextrose 50% Injectable 12.5 Gram(s) IV Push once  dextrose 50% Injectable 25 Gram(s) IV Push once  dextrose 50% Injectable 25 Gram(s) IV Push once  glucagon  Injectable 1 milliGRAM(s) IntraMuscular once PRN  insulin lispro (HumaLOG) corrective regimen sliding scale   SubCutaneous three times a day before meals  levothyroxine 125 MICROGram(s) Oral daily  calcium carbonate   Suspension 600 milliGRAM(s) Oral every 12 hours  cholecalciferol 1000 Unit(s) Oral daily  cyanocobalamin 1000 MICROGram(s) Oral daily  dextrose 5%. 1000 milliLiter(s) IV Continuous <Continuous>  dextrose 5%. 1000 milliLiter(s) IV Continuous <Continuous>      PAST MEDICAL/SURGICAL HISTORY  PAST MEDICAL & SURGICAL HISTORY:  Dementia  Hypothyroid  HTN (hypertension)  H/O knee surgery      SOCIAL HISTORY: Substance Use (street drugs): ( x ) never used  (  ) other:    FAMILY HISTORY:  No pertinent family history in first degree relatives      REVIEW OF SYSTEMS:    unable to obtain     PHYSICAL EXAM:  T(C): 36.4 (04-17-20 @ 13:40), Max: 37.2 (04-17-20 @ 01:29)  HR: 84 (04-17-20 @ 20:52) (70 - 84)  BP: 83/51 (04-17-20 @ 20:52) (83/51 - 121/75)  RR: 20 (04-17-20 @ 20:52) (20 - 22)  SpO2: 86% (04-17-20 @ 20:52) (86% - 96%)  Wt(kg): --  I&O's Summary        GENERAL: on NRB mild resp distress  Cardiovascular: Normal S1 S2, No JVD, systolic murmur +   Respiratory: b/l basal creps 	  Gastrointestinal:  Soft, Non-tender, + BS	  Extremities: No clubbing, cyanosis or edema                                13.1   10.23 )-----------( 233      ( 16 Apr 2020 05:59 )             41.0     04-16    141  |  101  |  54<H>  ----------------------------<  166<H>  4.9   |  25  |  0.94    Ca    9.5      16 Apr 2020 05:59    TPro  7.0  /  Alb  3.2<L>  /  TBili  0.8  /  DBili  x   /  AST  206<H>  /  ALT  171<H>  /  AlkPhos  72  04-16    proBNP:   Lipid Profile:   HgA1c:   TSH:     Consultant(s) Notes Reviewed:  [x ] YES  [ ] NO    Care Discussed with Consultants/Other Providers [ x] YES  [ ] NO    Imaging Personally Reviewed independently:  [x] YES  [ ] NO    All labs, radiologic studies, vitals, orders and medications list reviewed. Patient is seen and examined at bedside. Case discussed with medical team.

## 2020-04-17 NOTE — RAPID RESPONSE TEAM SUMMARY - NSSITUATIONBACKGROUNDRRT_GEN_ALL_CORE
RRT called for hypoxia. However in clinical context patient it 89 F with multiple comorbidities COVID positive on NRB. PAtient was saturating 89 F with multiple comorbidities COVID positive on NRB s/p plaquenil, steroids and Anakira treatment courses. Patient was saturating in the 70s and a RRT was called. During the RRT given her poor baseline functional status and current prognosis, it was decided to go through the comfort route and not to pursue aggressive measures. She was repositioned and sats went back up to the 80s, had a fever and given IV tylenol and held off intubation for now. Updated primary team and would update family of any new updates. Very poor prognosis overall and would continue to monitor and have further GOC and may need the clinical review team input.

## 2020-04-17 NOTE — PROVIDER CONTACT NOTE (OTHER) - BACKGROUND
Patient not DNR, DNI per ethics
(+) Covid
COVID Virus Infection, new onset A fib, HTN, Dementia
COVID virus infection, A Fib new onset, HTN, dementia
Patient has history of HTN, hypothyroid, dementia.

## 2020-04-17 NOTE — PROGRESS NOTE ADULT - ATTENDING COMMENTS
4/17/20 - Updated Granddaughter Sarah (pediatrician) via telephone who is the HCP. Granddaughter received permission to visit yesterday given critical status. Sarah okay with prn dilaudid for air hunger but would like to continue to keep patient full code in terms of CPR/compressions despite extensive discussion about very poor prognosis and very low probability of return of function should that event occur. As previously discussed by prior medical providers, intubation not being offered given extremely low chance of recovery and extubation.

## 2020-04-17 NOTE — PROGRESS NOTE ADULT - ASSESSMENT
EKG: afib with vent rate of 71 QTc 439 ms     Assessment and Plan     1) Afib: acceptable ventricular response, metoprolol 2.5 mg PRN for HR>120 c/w a/c     2) COVID 19 PNA: on Hydroxychloroquine monitor QTc , worsening hypoxia , on % fio2     3) Elevated D dimer : lovenox 1mg/kg for afib and elevated ddimer with covid infection     4) Elevated BNP:  monitor volume status. currently euvolemic on exam      DVT pPX will be on full a/c

## 2020-04-17 NOTE — PROVIDER CONTACT NOTE (OTHER) - RECOMMENDATIONS
provider states that 99 isok. and she started pt on dextrose at 30cc/hr
Call rapid response.
Chest PT. placed in prone.

## 2020-04-17 NOTE — PROGRESS NOTE ADULT - PROBLEM SELECTOR PLAN 1
-worsening respiratory status , on NRB mask, lethargic, steroid and Anakinra , added , case d/w ID Dr Aponte by prior hospitalist, recommending either steroid or Anakinra not both, steroid discontinued  - albuterol inhaler PRN  - will trend inflammatory markers and lactate  - transaminitis secondary to COVID infection, will continue to monitor

## 2020-04-17 NOTE — PROVIDER CONTACT NOTE (OTHER) - ACTION/TREATMENT ORDERED:
Provider made aware and came to [facility] to assess the situation.
50%dextrose 12.5g administered.  will reassess in 15 mins
Place 2L NC under mask, melatonin ordered, continue to monitor
Provider aware. Rechecked O2 sat 89-90%. Will continue to monitor.
Provider notified. Provider said to hold insulin for breakfast. Will continue to monitor O2 saturation and blood glucose.
Provider to come see patient.
no further action at this time, continue to monitor

## 2020-04-17 NOTE — PROGRESS NOTE ADULT - REASON FOR ADMISSION
Hypoxic Respiratory Failure

## 2020-04-17 NOTE — PROGRESS NOTE ADULT - PROBLEM SELECTOR PLAN 6
- DVT ppx: lovenox  - Diet: dysphagia 3 w/ honey consistency liquid  - Code status: per HCP pt full code for now
- DVT ppx: lovenox  - Diet: dysphagia 3 w/ honey consistency liquid  - Code status: per HCP pt full code for now
- DVT ppx: lovenox 30  - Diet: pt needs bedside swallow; will start dysphagia 3 w/ honey consistency liquid  - Code status: per HCP pt full code for now;
- DVT ppx: lovenox 30  - Diet: pt needs bedside swallow; will start dysphagia 3 w/ honey consistency liquid  - Code status: per HCP pt full code for now;

## 2020-04-17 NOTE — PROVIDER CONTACT NOTE (OTHER) - ASSESSMENT
Patient nonverbal. O2 86% on NRB. RN placed on side with no improvement. BP 83/51. HR 84.
pt lethargic. not swallowing well
Patient on 15L NRB, RR 28, /79, HR 79, T 97.9
Patient put into prone position, chest pt performed, O2 saturation elevated to 91%
Patient removing NRB mask, O2 sat 81% without mask

## 2020-04-18 VITALS
TEMPERATURE: 99 F | RESPIRATION RATE: 20 BRPM | OXYGEN SATURATION: 92 % | HEART RATE: 72 BPM | DIASTOLIC BLOOD PRESSURE: 49 MMHG | SYSTOLIC BLOOD PRESSURE: 91 MMHG

## 2020-04-18 LAB — GLUCOSE BLDC GLUCOMTR-MCNC: 141 MG/DL — HIGH (ref 70–99)

## 2020-04-18 PROCEDURE — 99238 HOSP IP/OBS DSCHRG MGMT 30/<: CPT

## 2020-04-18 RX ADMIN — ENOXAPARIN SODIUM 65 MILLIGRAM(S): 100 INJECTION SUBCUTANEOUS at 06:03

## 2020-04-18 RX ADMIN — Medication 100 MILLIGRAM(S): at 06:03

## 2020-04-18 NOTE — DISCHARGE NOTE FOR THE EXPIRED PATIENT - NS PRELIMINARY CAUSE OF DEATH_RESTRICTED
Other: Products Recommended: Products containing zinc, Elta MD products General Sunscreen Counseling: I recommended a broad spectrum sunscreen with a SPF of 30 or higher.  I explained that SPF 30 sunscreens block approximately 97 percent of the sun's harmful rays.  Sunscreens should be applied at least 15 minutes prior to expected sun exposure and then every 2 hours after that as long as sun exposure continues. If swimming or exercising sunscreen should be reapplied every 45 minutes to an hour after getting wet or sweating.  One ounce, or the equivalent of a shot glass full of sunscreen, is adequate to protect the skin not covered by a bathing suit. I also recommended a lip balm with a sunscreen as well. Sun protective clothing can be used in lieu of sunscreen but must be worn the entire time you are exposed to the sun's rays. Detail Level: Zone

## 2020-04-18 NOTE — CHART NOTE - NSCHARTNOTEFT_GEN_A_CORE
Responded to RRT, Pt O2 Sat in 70s and a RRT was called. Rapid response team informed that full code in terms of CPR/compressions despite extensive discussion about very poor prognosis as per hospitalist [facility] ([facility]) though, as previously documented, Pt is not being offered intubation. She was repositioned and sats went back up to the 80s, had a fever and given IV tylenol. Very poor prognosis overall and would continue to monitor and have further GOC and may need the clinical review team input.    Refer to RRT note for more details. Will continue to monitor.

## 2021-01-20 NOTE — DISCHARGE NOTE ADULT - SECONDARY DIAGNOSIS.
Wheezing Fever H/O knee surgery Hypothyroid Dementia Imiquimod Counseling:  I discussed with the patient the risks of imiquimod including but not limited to erythema, scaling, itching, weeping, crusting, and pain.  Patient understands that the inflammatory response to imiquimod is variable from person to person and was educated regarded proper titration schedule.  If flu-like symptoms develop, patient knows to discontinue the medication and contact us.

## 2021-06-18 NOTE — DISCHARGE NOTE FOR THE EXPIRED PATIENT - OTHER SIGNIFICANT FINDINGS
clears
89 yo F full code with pmhx of hypothyroidism, dementia, HTN comes from home via EMS d/t sob,cough,fever and low SPO2.    +COVID--> Plaquenil  +Afib- metoprolol prn, on Therapuetic Lovenox  ^D- dimer- Lov 65mg BID    Patient found unresponsive and pulseless with on spontaneous respiration, Pronounced dead at 9:45am. Granddaughter called and informed. Attending notified.

## 2022-01-01 NOTE — ED PROVIDER NOTE - CARDIAC, MLM
ineffective breastfeeding/sore nipples/knowledge deficit Normal rate, regular rhythm.  Heart sounds S1, S2.  No murmurs, rubs or gallops. ineffective breastfeeding/knowledge deficit

## 2022-03-27 NOTE — ED PROVIDER NOTE - CHIEF COMPLAINT
The patient is a 89y Female complaining of fever. PAST SURGICAL HISTORY:  H/O mastectomy, right     Status post knee replacement B/L

## 2023-03-31 NOTE — ED PROVIDER NOTE - NS ED MD DISPO DISCHARGE
[facility] - Outpatient Follow-up Note  Debby Cooney 80 y o  female MRN: [id number]  Encounter: [id number]          ASSESSMENT AND PLAN:    Debby Cooney is a 80 y o  female with COPD requiring home oxygen, presenting for follow-up of GERD and pancreatic cyst   She is a poor surgical candidate and is also at risk for complications from procedures, so I would very much like to avoid additional scopes  For the most part, her GERD is under control but we discussed some possible medication changes as outlined below  Her pancreatic cyst has been stable in size, and she would not be a good candidate for surgical management large  She also has incomplete bowel movements with some incontinence  GERD  We can try switching to pantoprazole 40 mg twice daily to see if this gives her better control  If not, she can go back to once daily pantoprazole and famotidine at night  We also reviewed dietary triggers for GERD  Pancreatic cyst   We will plan MRI/MRCP next year to monitor the cyst     Bowel function  I have asked her to start taking Metamucil twice daily to help with stool formation  We can increase this dose in the future  If she remains constipated, MiraLAX remains an option  Return in 6 months  1  Gastroesophageal reflux disease without esophagitis    2  Pancreatic cyst    3  Chronic idiopathic constipation    4  Incomplete defecation        No orders of the defined types were placed in this encounter     ______________________________________________________________________    SUBJECTIVE:    Debby Cooney is a 80 y o  female with COPD on 4 L home oxygen, who presents for follow-up of GERD and pancreatic cysts  She is in the office with her   Overall she is doing okay  She takes pantoprazole 40 mg daily 30 minutes before breakfast   She also takes famotidine as needed  She still has occasional symptoms of GERD  No dysphagia    She previously reported association of GERD symptoms with eating onions, but today says there are no specific food triggers  She takes MiraLAX and has 1 bowel movement every other day, but feels that stools are incomplete and she has leakage  She had EGD with EUS in 11/2020  This found a nonobstructing Schatzki's ring which was obliterated  She has a multilocular septated cyst measuring 16 x 12 mm with well-defined margins in the head of the pancreas  Aspiration of fluid was unsuccessful  She had CT of the abdomen and pelvis in 3/2022 which showed that the cyst was unchanged in size and measured 1 4 x 1 4 cm with a PD of 4 mm  Our plan was to do MRI/MRCP in 2024 for follow-up  REVIEW OF SYSTEMS IS OTHERWISE NEGATIVE        Historical Information   Past Medical History:   Diagnosis Date   • Anxiety    • Back pain    • Cancer (HCC)     skin   • Cataract    • COPD (chronic obstructive pulmonary disease) (HCC)    • Disease of thyroid gland    • Emphysema lung (HCC)    • Emphysema of lung ([address] )    • Geographic tongue     last assessed: 07/25/2014   • GERD (gastroesophageal reflux disease)    • Hyperlipidemia    • Hypertension    • Hypothyroidism (acquired)    • Mild intermittent asthma    • Multiple pulmonary nodules 7/5/2017    · Last imaging study April 2017 shows stable nodules, no further follow-up recommended · Initial CT scan was 2012 at [facility] demonstrating a 6 mm right upper lobe nodule   • Sciatica    • Seasonal allergies      Past Surgical History:   Procedure Laterality Date   • BACK SURGERY     • ENDOSCOPIC ULTRASOUND (UPPER)  12/2020   • SKIN LESION EXCISION       Social History   Social History     Substance and Sexual Activity   Alcohol Use No     Social History     Substance and Sexual Activity   Drug Use No     Social History     Tobacco Use   Smoking Status Former   • Packs/day: 2 00   • Years: 36 00   • Pack years: 72 00   • Types: Cigarettes   • Start date: 12   • Quit date: 1993   • Years "since quittin 2   Smokeless Tobacco Never     Family History   Problem Relation Age of Onset   • Stroke Mother    • Cirrhosis Father         alcoholic   • Cancer Sister    • Cancer Daughter        Meds/Allergies       Current Outpatient Medications:   •  albuterol (PROVENTIL HFA,VENTOLIN HFA) 90 mcg/act inhaler  •  Ascorbic Acid (VITAMIN C PO)  •  aspirin 81 MG tablet  •  atorvastatin (LIPITOR) 10 mg tablet  •  azelastine (ASTELIN) 0 1 % nasal spray  •  Calcium Carbonate (CALCIUM 600 PO)  •  Cholecalciferol (VITAMIN D3) 2000 units capsule  •  ciclopirox (PENLAC) 8 % solution  •  cloNIDine (CATAPRES) 0 1 mg tablet  •  diltiazem (CARDIZEM CD) 180 mg 24 hr capsule  •  famotidine (PEPCID) 20 mg tablet  •  hydrochlorothiazide (HYDRODIURIL) 25 mg tablet  •  ipratropium-albuterol (DUO-NEB) 0 5-2 5 mg/3 mL nebulizer solution  •  levothyroxine 50 mcg tablet  •  montelukast (SINGULAIR) 10 mg tablet  •  Multiple Vitamin (multivitamin) capsule  •  Omega-3 Fatty Acids (FISH OIL PO)  •  pantoprazole (PROTONIX) 40 mg tablet  •  Zinc 50 MG TABS  •  nitroglycerin (NITROSTAT) 0 4 mg SL tablet  •  umeclidinium-vilanterol (Anoro Ellipta) 62 5-25 MCG/INH inhaler    Allergies   Allergen Reactions   • Other Other (See Comments)     Steroids, it effects her eyes           Objective     Blood pressure 128/60, pulse 84, temperature 97 8 °F (36 6 °C), temperature source Tympanic, height 5' 5\" (1 651 m), weight 64 9 kg (143 lb), SpO2 (!) 84 %, not currently breastfeeding  Body mass index is 23 8 kg/m²  PHYSICAL EXAM:      General Appearance:   Alert, cooperative, no distress   HEENT:   Normocephalic, atraumatic, anicteric  Neck:  Supple, symmetrical, trachea midline   Lungs:   Clear to auscultation bilaterally; no rales, rhonchi or wheezing; respirations unlabored; on supplemental oxygen  Heart[de-identified]   Regular rate and rhythm; no murmur, rub, or gallop     Abdomen:   Soft, non-tender, non-distended; normal bowel sounds; no masses, " no organomegaly    Genitalia:   Deferred    Rectal:   Deferred    Extremities:  No cyanosis or clubbing ; 1 leg edema    Pulses:  2+ and symmetric    Skin:  No jaundice, rashes, or lesions    Lymph nodes:  No palpable cervical lymphadenopathy        Lab Results:   No visits with results within 1 Day(s) from this visit     Latest known visit with results is:   Orders Only on 02/21/2023   Component Date Value   • Glucose, Random 02/21/2023 104 (H)    • BUN 02/21/2023 15    • Creatinine 02/21/2023 0 59 (L)    • eGFR 02/21/2023 89    • SL AMB BUN/CREATININE RA* 02/21/2023 25 (H)    • Sodium 02/21/2023 139    • Potassium 02/21/2023 3 5    • Chloride 02/21/2023 92 (L)    • CO2 02/21/2023 40 (H)    • Calcium 02/21/2023 9 2    • Protein, Total 02/21/2023 6 7    • Albumin 02/21/2023 4 2    • Globulin 02/21/2023 2 5    • Albumin/Globulin Ratio 02/21/2023 1 7    • TOTAL BILIRUBIN 02/21/2023 0 6    • Alkaline Phosphatase 02/21/2023 56    • AST 02/21/2023 26    • ALT 02/21/2023 24    • White Blood Cell Count 02/21/2023 4 7    • Red Blood Cell Count 02/21/2023 4 45    • Hemoglobin 02/21/2023 13 0    • HCT 02/21/2023 39 2    • MCV 02/21/2023 88 1    • MCH 02/21/2023 29 2    • MCHC 02/21/2023 33 2    • RDW 02/21/2023 13 1    • Platelet Count 43/36/6473 242    • SL AMB MPV 02/21/2023 11 0    • Neutrophils (Absolute) 02/21/2023 2,698    • Lymphocytes (Absolute) 02/21/2023 1,415    • Monocytes (Absolute) 02/21/2023 395    • Eosinophils (Absolute) 02/21/2023 141    • Basophils ABS 02/21/2023 52    • Neutrophils 02/21/2023 57 4    • Lymphocytes 02/21/2023 30 1    • Monocytes 02/21/2023 8 4    • Eosinophils 02/21/2023 3 0    • Basophils PCT 02/21/2023 1 1    • T3 Uptake Ratio 02/21/2023 31    • Free t4 02/21/2023 1 2    • TSH 02/21/2023 2 20        Lab Results   Component Value Date    WBC 4 7 02/21/2023    HGB 13 0 02/21/2023    HCT 39 2 02/21/2023    MCV 88 1 02/21/2023     02/21/2023       Lab Results   Component Value Date SODIUM 139 02/21/2023    K 3 5 02/21/2023    CL 92 (L) 02/21/2023    CO2 40 (H) 02/21/2023    AGAP 4 07/19/2020    BUN 15 02/21/2023    CREATININE 0 59 (L) 02/21/2023    GLUC 104 (H) 02/21/2023    CALCIUM 9 2 02/21/2023    AST 26 02/21/2023    ALT 24 02/21/2023    ALKPHOS 56 02/21/2023    TP 6 7 02/21/2023    TBILI 0 6 02/21/2023    EGFR 89 02/21/2023       Lab Results   Component Value Date    CRP 0 4 03/03/2020       Lab Results   Component Value Date    [id number] 1 73 02/11/2017    TSH 2 20 02/21/2023       No results found for: IRON, TIBC, FERRITIN    Radiology Results:   No results found  Home
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
## 2025-01-23 NOTE — PROGRESS NOTE ADULT - PROVIDER SPECIALTY LIST ADULT
Internal Medicine
Pulmonology
No

## 2025-06-12 NOTE — PROGRESS NOTE ADULT - PROBLEM/PLAN-2
Thank you for your visit with Dr. Timmons today.         Reach out over [employer] or call for any questions/concerns or to view results of ordered tests.       If you need a refill on your prescription, please call your pharmacy and let them know. Please be proactive and call before your medication runs out. The pharmacy will then contact us for the refill. Please allow 24-48 hours for the refill to be processed.      If Dr. Timmons has ordered additional laboratory or radiology testing to be done before your next scheduled office visit, those results will be discussed with you at that upcoming visit. This will allow you the opportunity to go over the results in person with Dr. Timmons.   If your results require immediate intervention, you will be contacted sooner by phone call.        Want to Say “Thank You” to a Nurse?  The [employer] Award® was created in memory of KATTY Balbuena by his family to say thank you to bedside nurses who provide an outstanding level of care.  Submit a nomination using any method below.     OR    [email or web address]         
DISPLAY PLAN FREE TEXT